# Patient Record
Sex: MALE | Race: WHITE | NOT HISPANIC OR LATINO | Employment: OTHER | URBAN - METROPOLITAN AREA
[De-identification: names, ages, dates, MRNs, and addresses within clinical notes are randomized per-mention and may not be internally consistent; named-entity substitution may affect disease eponyms.]

---

## 2023-07-11 DIAGNOSIS — I65.23 OCCLUSION AND STENOSIS OF BILATERAL CAROTID ARTERIES: Primary | ICD-10-CM

## 2023-08-02 ENCOUNTER — OFFICE VISIT (OUTPATIENT)
Dept: CARDIAC SURGERY | Facility: CLINIC | Age: 88
End: 2023-08-02
Payer: MEDICARE

## 2023-08-02 VITALS
HEIGHT: 64 IN | SYSTOLIC BLOOD PRESSURE: 135 MMHG | BODY MASS INDEX: 27.83 KG/M2 | HEART RATE: 62 BPM | DIASTOLIC BLOOD PRESSURE: 67 MMHG | WEIGHT: 163 LBS

## 2023-08-02 DIAGNOSIS — I65.23 OCCLUSION AND STENOSIS OF BILATERAL CAROTID ARTERIES: ICD-10-CM

## 2023-08-02 PROCEDURE — 1159F PR MEDICATION LIST DOCUMENTED IN MEDICAL RECORD: ICD-10-PCS | Mod: CPTII,,, | Performed by: THORACIC SURGERY (CARDIOTHORACIC VASCULAR SURGERY)

## 2023-08-02 PROCEDURE — 3288F PR FALLS RISK ASSESSMENT DOCUMENTED: ICD-10-PCS | Mod: CPTII,,, | Performed by: THORACIC SURGERY (CARDIOTHORACIC VASCULAR SURGERY)

## 2023-08-02 PROCEDURE — 3288F FALL RISK ASSESSMENT DOCD: CPT | Mod: CPTII,,, | Performed by: THORACIC SURGERY (CARDIOTHORACIC VASCULAR SURGERY)

## 2023-08-02 PROCEDURE — 1101F PR PT FALLS ASSESS DOC 0-1 FALLS W/OUT INJ PAST YR: ICD-10-PCS | Mod: CPTII,,, | Performed by: THORACIC SURGERY (CARDIOTHORACIC VASCULAR SURGERY)

## 2023-08-02 PROCEDURE — 99204 OFFICE O/P NEW MOD 45 MIN: CPT | Mod: ,,, | Performed by: THORACIC SURGERY (CARDIOTHORACIC VASCULAR SURGERY)

## 2023-08-02 PROCEDURE — 1159F MED LIST DOCD IN RCRD: CPT | Mod: CPTII,,, | Performed by: THORACIC SURGERY (CARDIOTHORACIC VASCULAR SURGERY)

## 2023-08-02 PROCEDURE — 1160F RVW MEDS BY RX/DR IN RCRD: CPT | Mod: CPTII,,, | Performed by: THORACIC SURGERY (CARDIOTHORACIC VASCULAR SURGERY)

## 2023-08-02 PROCEDURE — 1101F PT FALLS ASSESS-DOCD LE1/YR: CPT | Mod: CPTII,,, | Performed by: THORACIC SURGERY (CARDIOTHORACIC VASCULAR SURGERY)

## 2023-08-02 PROCEDURE — 99204 PR OFFICE/OUTPT VISIT, NEW, LEVL IV, 45-59 MIN: ICD-10-PCS | Mod: ,,, | Performed by: THORACIC SURGERY (CARDIOTHORACIC VASCULAR SURGERY)

## 2023-08-02 PROCEDURE — 1160F PR REVIEW ALL MEDS BY PRESCRIBER/CLIN PHARMACIST DOCUMENTED: ICD-10-PCS | Mod: CPTII,,, | Performed by: THORACIC SURGERY (CARDIOTHORACIC VASCULAR SURGERY)

## 2023-08-02 RX ORDER — FUROSEMIDE 20 MG/1
20 TABLET ORAL EVERY OTHER DAY
COMMUNITY
Start: 2023-06-14

## 2023-08-02 RX ORDER — CLOPIDOGREL BISULFATE 75 MG/1
75 TABLET ORAL DAILY
COMMUNITY

## 2023-08-02 RX ORDER — GLIPIZIDE 5 MG/1
5 TABLET, FILM COATED, EXTENDED RELEASE ORAL
COMMUNITY

## 2023-08-02 RX ORDER — ASPIRIN 81 MG/1
81 TABLET ORAL DAILY
Status: ON HOLD | COMMUNITY
End: 2024-02-15 | Stop reason: HOSPADM

## 2023-08-02 RX ORDER — FERROUS GLUCONATE 324(38)MG
324 TABLET ORAL
Status: ON HOLD | COMMUNITY
Start: 2023-07-13 | End: 2024-02-15 | Stop reason: HOSPADM

## 2023-08-02 RX ORDER — BLOOD SUGAR DIAGNOSTIC
STRIP MISCELLANEOUS
COMMUNITY
Start: 2023-06-27

## 2023-08-02 RX ORDER — SIMVASTATIN 40 MG/1
40 TABLET, FILM COATED ORAL NIGHTLY
COMMUNITY
Start: 2023-06-21

## 2023-08-02 RX ORDER — DOXAZOSIN 4 MG/1
4 TABLET ORAL
Status: ON HOLD | COMMUNITY
End: 2024-02-15 | Stop reason: HOSPADM

## 2023-08-02 NOTE — PROGRESS NOTES
"History & Physical    SUBJECTIVE:     History of Present Illness:  The patient is presenting for evaluation of bilateral carotid disease with 75% on the left side and 70% on the right.  He was evaluated by Dr. Hinson with an angiogram.  He reports dizziness.  Chief Complaint   Patient presents with    Pre-op Exam     Thad Carotid Stenosis  ref Dr. Hinson       Review of patient's allergies indicates:   Allergen Reactions    Benicar [olmesartan] Other (See Comments)       Current Outpatient Medications   Medication Sig Dispense Refill    furosemide (LASIX) 20 MG tablet Take 20 mg by mouth.      doxazosin (CARDURA) 4 MG tablet Take 4 mg by mouth.      ferrous gluconate (FERGON) 324 MG tablet Take by mouth.      glipiZIDE 5 MG TR24 Take 5 mg by mouth.      ONETOUCH ULTRA TEST Strp USE TO CHECK BLOOD GLUCOSE ONCE EVERY DAY      simvastatin (ZOCOR) 40 MG tablet Take 40 mg by mouth every evening.       No current facility-administered medications for this visit.       Past Medical History:   Diagnosis Date    Bleeding ulcer     Carotid disease, bilateral     Diabetes mellitus     Hypercholesteremia      Past Surgical History:   Procedure Laterality Date    carotid stenosis      SKIN CANCER EXCISION       History reviewed. No pertinent family history.  Social History     Tobacco Use    Smoking status: Former     Current packs/day: 0.00     Types: Cigarettes, Pipe    Smokeless tobacco: Former        Review of Systems:  Review of Systems   Neurological:  Positive for dizziness.       OBJECTIVE:     Vital Signs (Most Recent)  Pulse: 62 (08/02/23 1336)  BP: 135/67 (08/02/23 1336)  5' 4" (1.626 m)  73.9 kg (163 lb)     Physical Exam:  Physical Exam  Vitals reviewed.   Constitutional:       Appearance: Normal appearance.   HENT:      Head: Normocephalic and atraumatic.      Nose: Nose normal.      Mouth/Throat:      Mouth: Mucous membranes are dry.      Pharynx: Oropharynx is clear.   Eyes:      Extraocular Movements: Extraocular " movements intact.      Conjunctiva/sclera: Conjunctivae normal.      Pupils: Pupils are equal, round, and reactive to light.   Cardiovascular:      Rate and Rhythm: Normal rate and regular rhythm.      Pulses: Normal pulses.   Pulmonary:      Effort: Pulmonary effort is normal.      Breath sounds: Wheezing present.   Abdominal:      General: Abdomen is flat.      Palpations: Abdomen is soft.   Musculoskeletal:         General: Normal range of motion.      Cervical back: Neck supple.   Skin:     General: Skin is warm and dry.   Neurological:      General: No focal deficit present.   Psychiatric:         Mood and Affect: Mood normal.         Laboratory:  Negative      Diagnostic Results:  Angiogram reviewed.      ASSESSMENT/PLAN:     Bilateral carotid stenosis 75% on the left side and 70% on the right.  The patient definitely benefit from left carotid endarterectomy with pericardial patch angioplasty.  We will stage the right carotid for later.  The patient also would benefit from a Pulmonary consultation.  He understands the risks and benefits of procedure including risk of bleeding infection and strokes.  He elected to proceed

## 2023-08-23 ENCOUNTER — ANESTHESIA EVENT (OUTPATIENT)
Dept: SURGERY | Facility: HOSPITAL | Age: 88
DRG: 039 | End: 2023-08-23
Payer: MEDICARE

## 2023-08-25 ENCOUNTER — HOSPITAL ENCOUNTER (OUTPATIENT)
Dept: RADIOLOGY | Facility: HOSPITAL | Age: 88
Discharge: HOME | End: 2023-08-25
Attending: THORACIC SURGERY (CARDIOTHORACIC VASCULAR SURGERY)
Payer: MEDICARE

## 2023-08-25 DIAGNOSIS — I65.23 OCCLUSION AND STENOSIS OF BILATERAL CAROTID ARTERIES: ICD-10-CM

## 2023-08-25 PROCEDURE — 71046 X-RAY EXAM CHEST 2 VIEWS: CPT | Mod: TC

## 2023-08-28 ENCOUNTER — TELEPHONE (OUTPATIENT)
Dept: CARDIAC SURGERY | Facility: CLINIC | Age: 88
End: 2023-08-28
Payer: MEDICARE

## 2023-08-28 DIAGNOSIS — I65.23 OCCLUSION AND STENOSIS OF BILATERAL CAROTID ARTERIES: Primary | ICD-10-CM

## 2023-08-28 NOTE — TELEPHONE ENCOUNTER
Notified Dr. Maza/[facility] and per Dr. Maza pt to be rescheduled for next week.  Called and spoke with dgt and she will call back with date.  Notified Evelin in surgery that case in cancelled for tomorrow 8/29/23 and will call back with a date to be rescheduled.   ----- Message from Martha Tomlin sent at 8/28/2023  8:24 AM CDT -----  Regarding: Er Visit/Sx Tomorrow  Patient daughter, Dior called. They brought the pt twice to er over the weekend. First time to [facility] where he was dx with UTI w/hematuria. Sent home with medication. Hematuria worsened the next day, was brought to [facility] in [place]. ER packet/note was printed and put on your desk.     Family is worried about Sx scheduled tomorrow with . Do they need to do anything or RE-Schedule?    Please call Dior, #[phone]

## 2023-08-29 ENCOUNTER — ANESTHESIA (OUTPATIENT)
Dept: SURGERY | Facility: HOSPITAL | Age: 88
DRG: 039 | End: 2023-08-29
Payer: MEDICARE

## 2023-09-07 NOTE — PRE-PROCEDURE INSTRUCTIONS
[facility]: Outpatient Surgery  Preprocedure Instructions    Your physician's office will be calling you with your arrival time.    You will arrive at [facility], [address], [place].  Enter through the [place] entrance next to the Emergency Room, and come to the 6th floor to the Outpatient Surgery Department.     Visitory Policy:  You are allowed 2 adult visitors to be with you in the hospital. Please, no switching visitors in pre-op area. All hospital visitors should be in good current health.  No small children.     What to Bring:  Please have your ID, insurance cards, and all home medication bottles with you at check in.  Bring your CPAP machine if one is used at home.     Fasting:  Nothing to eat or drink after midnight the night before your procedure. This includes no ice, gum, hard candies, and/or tobacco products.    Medications:  Follow your doctor's instructions for taking any medications on the morning of your procedure.  If no instructions for taking medications were given, do not take any medications but bring your medications in their bottles to your procedure check in.     Follow your doctor's preoperative instructions regarding skin prep, bowel prep, bathing, or showering prior to your procedure.  If any special soaps were provided to you, please use according to your doctor's instructions. If no instructions were given from your doctor, take a good bath or shower with antibacterial soap the night before and the morning of your procedure.  On the morning of procedure, wear loose, comfortable clothing.  No lotions, makeup, perfumes, colognes, deodorant, or jewelry to your procedure.  Removable items (glasses, contact lenses, dentures, retainers, hearing aids) need to be removed for your procedure.  Bring your storage containers for these items if you wear them.     Artificial nails, body jewelry, eyelash extensions, and/or hair extensions with metal  clips are not allowed during your surgery.  If you currently wear any of these items, please arrange for them to be removed prior to your arrival to the hospital.     Outpatient or Same Day Surgeries:  Any patients receiving sedation/anesthesia are advised not to drive for 24 hours after their procedure.  We do not allow patients to drive themselves home after discharge.  If you are going home after your procedure, please have someone available to drive you home from the hospital.        You may call the Outpatient Surgery Department at [phone] with any questions or concerns.  We are looking forward to meeting you and taking great care of you for your procedure.  Thank you for choosing [facility] for your surgical needs.

## 2023-09-07 NOTE — H&P
[facility]  History & Physical  Cardiothoracic Surgery    SUBJECTIVE:     Chief Complaint/Reason for Admission: dizziness    History of Present Illness:  Patient is a 89 y.o. male presents with The patient is presenting for evaluation of bilateral carotid disease with 75% on the left side and 70% on the right.  He was evaluated by Dr. Hinson with an angiogram.  He reports dizziness.      Family History of Heart Disease: no    No current facility-administered medications on file prior to encounter.     Current Outpatient Medications on File Prior to Encounter   Medication Sig Dispense Refill    aspirin (ECOTRIN) 81 MG EC tablet Take 81 mg by mouth once daily.      clopidogreL (PLAVIX) 75 mg tablet Take 75 mg by mouth once daily.      doxazosin (CARDURA) 4 MG tablet Take 4 mg by mouth.      ferrous gluconate (FERGON) 324 MG tablet Take by mouth.      furosemide (LASIX) 20 MG tablet Take 20 mg by mouth.      glipiZIDE 5 MG TR24 Take 5 mg by mouth.      ONETOUCH ULTRA TEST Strp USE TO CHECK BLOOD GLUCOSE ONCE EVERY DAY      simvastatin (ZOCOR) 40 MG tablet Take 40 mg by mouth every evening.          Review of patient's allergies indicates:   Allergen Reactions    Benicar [olmesartan] Other (See Comments)        Past Medical History:   Diagnosis Date    Bleeding ulcer     Carotid disease, bilateral     Chronic kidney failure, stage 3 (moderate)     Cough     Diabetes mellitus     HLD (hyperlipidemia)     Hypercholesteremia     Lightheadedness     Skin cancer of face     SOB (shortness of breath)         Past Surgical History:   Procedure Laterality Date    ESOPHAGOGASTRODUODENOSCOPY      SKIN CANCER EXCISION      multiple           Review of Systems:  Review of Systems   Neurological:  Positive for dizziness.   All other systems reviewed and are negative.       OBJECTIVE:        Physical Exam:  Physical Exam  Vitals reviewed.   HENT:      Head: Normocephalic.      Right Ear: Tympanic  membrane normal.      Left Ear: Tympanic membrane normal.      Nose: Nose normal.      Mouth/Throat:      Mouth: Mucous membranes are moist.   Eyes:      Pupils: Pupils are equal, round, and reactive to light.   Cardiovascular:      Rate and Rhythm: Normal rate and regular rhythm.      Pulses: Normal pulses.   Pulmonary:      Effort: Pulmonary effort is normal.      Breath sounds: Normal breath sounds.   Abdominal:      Palpations: Abdomen is soft.   Musculoskeletal:         General: Normal range of motion.      Cervical back: Normal range of motion.   Skin:     General: Skin is warm.   Neurological:      General: No focal deficit present.      Mental Status: He is alert.   Psychiatric:         Mood and Affect: Mood normal.          Laboratory:  I have reviewed all pertinent lab results within the past 24 hours.    Diagnostic Results:  Carotid Duplex: Reviewed          ASSESSMENT/PLAN:     A: Bilateral carotid stenosis  P: L CEA

## 2023-09-08 ENCOUNTER — HOSPITAL ENCOUNTER (INPATIENT)
Facility: HOSPITAL | Age: 88
LOS: 1 days | Discharge: HOME | DRG: 039 | End: 2023-09-09
Attending: THORACIC SURGERY (CARDIOTHORACIC VASCULAR SURGERY) | Admitting: THORACIC SURGERY (CARDIOTHORACIC VASCULAR SURGERY)
Payer: MEDICARE

## 2023-09-08 DIAGNOSIS — I65.23 OCCLUSION AND STENOSIS OF BILATERAL CAROTID ARTERIES: ICD-10-CM

## 2023-09-08 LAB
ANION GAP SERPL CALC-SCNC: 9 MEQ/L
BUN SERPL-MCNC: 25 MG/DL (ref 8.4–25.7)
CALCIUM SERPL-MCNC: 9.2 MG/DL (ref 8.8–10)
CHLORIDE SERPL-SCNC: 109 MMOL/L (ref 98–111)
CO2 SERPL-SCNC: 24 MMOL/L (ref 23–31)
CREAT SERPL-MCNC: 1.69 MG/DL (ref 0.73–1.18)
CREAT/UREA NIT SERPL: 15
GFR SERPLBLD CREATININE-BSD FMLA CKD-EPI: 38 MLS/MIN/1.73/M2
GLUCOSE SERPL-MCNC: 103 MG/DL (ref 75–121)
GROUP & RH: NORMAL
INDIRECT COOMBS GEL: NORMAL
POCT GLUCOSE: 120 MG/DL (ref 70–110)
POTASSIUM SERPL-SCNC: 4.3 MMOL/L (ref 3.5–5.1)
SODIUM SERPL-SCNC: 142 MMOL/L (ref 132–146)
SPECIMEN OUTDATE: NORMAL

## 2023-09-08 PROCEDURE — 25000003 PHARM REV CODE 250: Performed by: PHYSICIAN ASSISTANT

## 2023-09-08 PROCEDURE — 86901 BLOOD TYPING SEROLOGIC RH(D): CPT | Performed by: THORACIC SURGERY (CARDIOTHORACIC VASCULAR SURGERY)

## 2023-09-08 PROCEDURE — D9220A PRA ANESTHESIA: Mod: CRNA,,, | Performed by: NURSE ANESTHETIST, CERTIFIED REGISTERED

## 2023-09-08 PROCEDURE — 35301 PR THROMBOENDARTECTMY NECK,NECK INCIS: ICD-10-PCS | Mod: AS,LT,, | Performed by: PHYSICIAN ASSISTANT

## 2023-09-08 PROCEDURE — 36000707: Performed by: THORACIC SURGERY (CARDIOTHORACIC VASCULAR SURGERY)

## 2023-09-08 PROCEDURE — 35301 RECHANNELING OF ARTERY: CPT | Mod: LT,,, | Performed by: THORACIC SURGERY (CARDIOTHORACIC VASCULAR SURGERY)

## 2023-09-08 PROCEDURE — 71000039 HC RECOVERY, EACH ADD'L HOUR: Performed by: THORACIC SURGERY (CARDIOTHORACIC VASCULAR SURGERY)

## 2023-09-08 PROCEDURE — 25000003 PHARM REV CODE 250: Performed by: THORACIC SURGERY (CARDIOTHORACIC VASCULAR SURGERY)

## 2023-09-08 PROCEDURE — 71000033 HC RECOVERY, INTIAL HOUR: Performed by: THORACIC SURGERY (CARDIOTHORACIC VASCULAR SURGERY)

## 2023-09-08 PROCEDURE — 11000001 HC ACUTE MED/SURG PRIVATE ROOM

## 2023-09-08 PROCEDURE — D9220A PRA ANESTHESIA: ICD-10-PCS | Mod: ANES,,, | Performed by: ANESTHESIOLOGY

## 2023-09-08 PROCEDURE — 99499 NO LOS: ICD-10-PCS | Mod: ,,, | Performed by: PHYSICIAN ASSISTANT

## 2023-09-08 PROCEDURE — P9047 ALBUMIN (HUMAN), 25%, 50ML: HCPCS | Mod: JZ,JG | Performed by: NURSE ANESTHETIST, CERTIFIED REGISTERED

## 2023-09-08 PROCEDURE — 88311 DECALCIFY TISSUE: CPT

## 2023-09-08 PROCEDURE — 99499 UNLISTED E&M SERVICE: CPT | Mod: ,,, | Performed by: PHYSICIAN ASSISTANT

## 2023-09-08 PROCEDURE — 35301 RECHANNELING OF ARTERY: CPT | Mod: AS,LT,, | Performed by: PHYSICIAN ASSISTANT

## 2023-09-08 PROCEDURE — 86923 COMPATIBILITY TEST ELECTRIC: CPT | Performed by: THORACIC SURGERY (CARDIOTHORACIC VASCULAR SURGERY)

## 2023-09-08 PROCEDURE — 36620 ARTERIAL: ICD-10-PCS | Mod: 59,,, | Performed by: ANESTHESIOLOGY

## 2023-09-08 PROCEDURE — 80048 BASIC METABOLIC PNL TOTAL CA: CPT | Performed by: THORACIC SURGERY (CARDIOTHORACIC VASCULAR SURGERY)

## 2023-09-08 PROCEDURE — 63600175 PHARM REV CODE 636 W HCPCS: Mod: JZ,JG | Performed by: NURSE ANESTHETIST, CERTIFIED REGISTERED

## 2023-09-08 PROCEDURE — 35301 PR THROMBOENDARTECTMY NECK,NECK INCIS: ICD-10-PCS | Mod: LT,,, | Performed by: THORACIC SURGERY (CARDIOTHORACIC VASCULAR SURGERY)

## 2023-09-08 PROCEDURE — 88304 TISSUE EXAM BY PATHOLOGIST: CPT | Performed by: THORACIC SURGERY (CARDIOTHORACIC VASCULAR SURGERY)

## 2023-09-08 PROCEDURE — D9220A PRA ANESTHESIA: ICD-10-PCS | Mod: CRNA,,, | Performed by: NURSE ANESTHETIST, CERTIFIED REGISTERED

## 2023-09-08 PROCEDURE — 63600175 PHARM REV CODE 636 W HCPCS: Performed by: THORACIC SURGERY (CARDIOTHORACIC VASCULAR SURGERY)

## 2023-09-08 PROCEDURE — D9220A PRA ANESTHESIA: Mod: ANES,,, | Performed by: ANESTHESIOLOGY

## 2023-09-08 PROCEDURE — 36620 INSERTION CATHETER ARTERY: CPT | Performed by: ANESTHESIOLOGY

## 2023-09-08 PROCEDURE — 63600175 PHARM REV CODE 636 W HCPCS: Performed by: ANESTHESIOLOGY

## 2023-09-08 PROCEDURE — 37000009 HC ANESTHESIA EA ADD 15 MINS: Performed by: THORACIC SURGERY (CARDIOTHORACIC VASCULAR SURGERY)

## 2023-09-08 PROCEDURE — 25000003 PHARM REV CODE 250: Performed by: NURSE ANESTHETIST, CERTIFIED REGISTERED

## 2023-09-08 PROCEDURE — 37000008 HC ANESTHESIA 1ST 15 MINUTES: Performed by: THORACIC SURGERY (CARDIOTHORACIC VASCULAR SURGERY)

## 2023-09-08 PROCEDURE — 36000706: Performed by: THORACIC SURGERY (CARDIOTHORACIC VASCULAR SURGERY)

## 2023-09-08 RX ORDER — FENTANYL CITRATE 50 UG/ML
INJECTION, SOLUTION INTRAMUSCULAR; INTRAVENOUS
Status: DISCONTINUED | OUTPATIENT
Start: 2023-09-08 | End: 2023-09-28

## 2023-09-08 RX ORDER — ONDANSETRON 2 MG/ML
4 INJECTION INTRAMUSCULAR; INTRAVENOUS EVERY 12 HOURS PRN
Status: DISCONTINUED | OUTPATIENT
Start: 2023-09-08 | End: 2023-09-09 | Stop reason: HOSPADM

## 2023-09-08 RX ORDER — DIPHENHYDRAMINE HYDROCHLORIDE 50 MG/ML
25 INJECTION INTRAMUSCULAR; INTRAVENOUS EVERY 6 HOURS PRN
Status: DISCONTINUED | OUTPATIENT
Start: 2023-09-08 | End: 2023-09-08 | Stop reason: HOSPADM

## 2023-09-08 RX ORDER — HYDRALAZINE HYDROCHLORIDE 20 MG/ML
5 INJECTION INTRAMUSCULAR; INTRAVENOUS
Status: DISCONTINUED | OUTPATIENT
Start: 2023-09-08 | End: 2023-09-09 | Stop reason: HOSPADM

## 2023-09-08 RX ORDER — ETOMIDATE 2 MG/ML
INJECTION INTRAVENOUS
Status: DISCONTINUED | OUTPATIENT
Start: 2023-09-08 | End: 2023-09-28

## 2023-09-08 RX ORDER — ASPIRIN 81 MG/1
81 TABLET ORAL DAILY
Status: DISCONTINUED | OUTPATIENT
Start: 2023-09-08 | End: 2023-09-09 | Stop reason: HOSPADM

## 2023-09-08 RX ORDER — ALBUMIN HUMAN 250 G/1000ML
SOLUTION INTRAVENOUS CONTINUOUS PRN
Status: DISCONTINUED | OUTPATIENT
Start: 2023-09-08 | End: 2023-09-28

## 2023-09-08 RX ORDER — HEPARIN SODIUM 1000 [USP'U]/ML
INJECTION, SOLUTION INTRAVENOUS; SUBCUTANEOUS
Status: DISCONTINUED | OUTPATIENT
Start: 2023-09-08 | End: 2023-09-28

## 2023-09-08 RX ORDER — MEPERIDINE HYDROCHLORIDE 25 MG/ML
12.5 INJECTION INTRAMUSCULAR; INTRAVENOUS; SUBCUTANEOUS ONCE
Status: DISCONTINUED | OUTPATIENT
Start: 2023-09-08 | End: 2023-09-08 | Stop reason: HOSPADM

## 2023-09-08 RX ORDER — ACETAMINOPHEN 325 MG/1
650 TABLET ORAL EVERY 8 HOURS PRN
Status: DISCONTINUED | OUTPATIENT
Start: 2023-09-08 | End: 2023-09-09 | Stop reason: HOSPADM

## 2023-09-08 RX ORDER — PROTAMINE SULFATE 10 MG/ML
INJECTION, SOLUTION INTRAVENOUS
Status: DISCONTINUED | OUTPATIENT
Start: 2023-09-08 | End: 2023-09-28

## 2023-09-08 RX ORDER — HYDROMORPHONE HYDROCHLORIDE 2 MG/ML
0.5 INJECTION, SOLUTION INTRAMUSCULAR; INTRAVENOUS; SUBCUTANEOUS EVERY 5 MIN PRN
Status: DISCONTINUED | OUTPATIENT
Start: 2023-09-08 | End: 2023-09-08 | Stop reason: HOSPADM

## 2023-09-08 RX ORDER — ONDANSETRON 2 MG/ML
INJECTION INTRAMUSCULAR; INTRAVENOUS
Status: DISCONTINUED | OUTPATIENT
Start: 2023-09-08 | End: 2023-09-28

## 2023-09-08 RX ORDER — DEXAMETHASONE SODIUM PHOSPHATE 4 MG/ML
INJECTION, SOLUTION INTRA-ARTICULAR; INTRALESIONAL; INTRAMUSCULAR; INTRAVENOUS; SOFT TISSUE
Status: DISCONTINUED | OUTPATIENT
Start: 2023-09-08 | End: 2023-09-28

## 2023-09-08 RX ORDER — LOPERAMIDE HYDROCHLORIDE 2 MG/1
4 CAPSULE ORAL ONCE
Status: DISCONTINUED | OUTPATIENT
Start: 2023-09-08 | End: 2023-09-09 | Stop reason: HOSPADM

## 2023-09-08 RX ORDER — FENTANYL CITRATE 50 UG/ML
INJECTION, SOLUTION INTRAMUSCULAR; INTRAVENOUS
Status: COMPLETED
Start: 2023-09-08 | End: 2023-09-08

## 2023-09-08 RX ORDER — ONDANSETRON 2 MG/ML
4 INJECTION INTRAMUSCULAR; INTRAVENOUS ONCE
Status: DISCONTINUED | OUTPATIENT
Start: 2023-09-08 | End: 2023-09-08 | Stop reason: HOSPADM

## 2023-09-08 RX ORDER — ROCURONIUM BROMIDE 10 MG/ML
INJECTION, SOLUTION INTRAVENOUS
Status: DISCONTINUED | OUTPATIENT
Start: 2023-09-08 | End: 2023-09-28

## 2023-09-08 RX ORDER — HEPARIN SODIUM 5000 [USP'U]/ML
INJECTION, SOLUTION INTRAVENOUS; SUBCUTANEOUS
Status: DISCONTINUED | OUTPATIENT
Start: 2023-09-08 | End: 2023-09-08 | Stop reason: HOSPADM

## 2023-09-08 RX ORDER — PROPOFOL 10 MG/ML
VIAL (ML) INTRAVENOUS
Status: DISCONTINUED | OUTPATIENT
Start: 2023-09-08 | End: 2023-09-28

## 2023-09-08 RX ORDER — HYDROCODONE BITARTRATE AND ACETAMINOPHEN 5; 325 MG/1; MG/1
1 TABLET ORAL EVERY 4 HOURS PRN
Status: DISCONTINUED | OUTPATIENT
Start: 2023-09-08 | End: 2023-09-09 | Stop reason: HOSPADM

## 2023-09-08 RX ORDER — DOCUSATE SODIUM 100 MG/1
100 CAPSULE, LIQUID FILLED ORAL 2 TIMES DAILY
Status: DISCONTINUED | OUTPATIENT
Start: 2023-09-08 | End: 2023-09-09 | Stop reason: HOSPADM

## 2023-09-08 RX ORDER — HYDROMORPHONE HYDROCHLORIDE 2 MG/ML
0.2 INJECTION, SOLUTION INTRAMUSCULAR; INTRAVENOUS; SUBCUTANEOUS EVERY 5 MIN PRN
Status: DISCONTINUED | OUTPATIENT
Start: 2023-09-08 | End: 2023-09-08 | Stop reason: HOSPADM

## 2023-09-08 RX ORDER — PHENYLEPHRINE HYDROCHLORIDE 10 MG/ML
INJECTION INTRAVENOUS
Status: DISCONTINUED | OUTPATIENT
Start: 2023-09-08 | End: 2023-09-28

## 2023-09-08 RX ORDER — EPHEDRINE SULFATE 50 MG/ML
INJECTION, SOLUTION INTRAVENOUS
Status: DISCONTINUED | OUTPATIENT
Start: 2023-09-08 | End: 2023-09-28

## 2023-09-08 RX ORDER — PROCHLORPERAZINE EDISYLATE 5 MG/ML
5 INJECTION INTRAMUSCULAR; INTRAVENOUS EVERY 6 HOURS PRN
Status: DISCONTINUED | OUTPATIENT
Start: 2023-09-08 | End: 2023-09-09 | Stop reason: HOSPADM

## 2023-09-08 RX ORDER — LIDOCAINE HYDROCHLORIDE 20 MG/ML
INJECTION, SOLUTION EPIDURAL; INFILTRATION; INTRACAUDAL; PERINEURAL
Status: DISCONTINUED | OUTPATIENT
Start: 2023-09-08 | End: 2023-09-28

## 2023-09-08 RX ADMIN — ETOMIDATE 20 MG: 2 INJECTION INTRAVENOUS at 11:09

## 2023-09-08 RX ADMIN — PHENYLEPHRINE HYDROCHLORIDE 0.3 MCG/KG/MIN: 10 INJECTION INTRAVENOUS at 11:09

## 2023-09-08 RX ADMIN — PROPOFOL 40 MG: 10 INJECTION, EMULSION INTRAVENOUS at 11:09

## 2023-09-08 RX ADMIN — DEXTROSE MONOHYDRATE 1.5 G: 5 INJECTION INTRAVENOUS at 11:09

## 2023-09-08 RX ADMIN — DOCUSATE SODIUM 100 MG: 100 CAPSULE, LIQUID FILLED ORAL at 08:09

## 2023-09-08 RX ADMIN — ONDANSETRON 4 MG: 2 INJECTION INTRAMUSCULAR; INTRAVENOUS at 11:09

## 2023-09-08 RX ADMIN — PHENYLEPHRINE HYDROCHLORIDE 100 MCG: 10 INJECTION INTRAVENOUS at 11:09

## 2023-09-08 RX ADMIN — PROTAMINE SULFATE 100 MG: 10 INJECTION, SOLUTION INTRAVENOUS at 12:09

## 2023-09-08 RX ADMIN — SUGAMMADEX 160 MG: 100 INJECTION, SOLUTION INTRAVENOUS at 12:09

## 2023-09-08 RX ADMIN — ALBUMIN (HUMAN): 12.5 SOLUTION INTRAVENOUS at 11:09

## 2023-09-08 RX ADMIN — ROCURONIUM BROMIDE 40 MG: 10 SOLUTION INTRAVENOUS at 11:09

## 2023-09-08 RX ADMIN — ACETAMINOPHEN 650 MG: 325 TABLET, FILM COATED ORAL at 08:09

## 2023-09-08 RX ADMIN — ASPIRIN 81 MG: 81 TABLET, COATED ORAL at 12:09

## 2023-09-08 RX ADMIN — SODIUM CHLORIDE, SODIUM GLUCONATE, SODIUM ACETATE, POTASSIUM CHLORIDE AND MAGNESIUM CHLORIDE: 526; 502; 368; 37; 30 INJECTION, SOLUTION INTRAVENOUS at 10:09

## 2023-09-08 RX ADMIN — LIDOCAINE HYDROCHLORIDE 80 MG: 20 INJECTION, SOLUTION EPIDURAL; INFILTRATION; INTRACAUDAL; PERINEURAL at 11:09

## 2023-09-08 RX ADMIN — DEXAMETHASONE SODIUM PHOSPHATE 4 MG: 4 INJECTION, SOLUTION INTRA-ARTICULAR; INTRALESIONAL; INTRAMUSCULAR; INTRAVENOUS; SOFT TISSUE at 11:09

## 2023-09-08 RX ADMIN — HEPARIN SODIUM 10000 UNITS: 1000 INJECTION, SOLUTION INTRAVENOUS; SUBCUTANEOUS at 11:09

## 2023-09-08 RX ADMIN — EPHEDRINE SULFATE 5 MG: 50 INJECTION INTRAVENOUS at 11:09

## 2023-09-08 RX ADMIN — PHENYLEPHRINE HYDROCHLORIDE 50 MCG: 10 INJECTION INTRAVENOUS at 11:09

## 2023-09-08 RX ADMIN — FENTANYL CITRATE 50 MCG: 50 INJECTION, SOLUTION INTRAMUSCULAR; INTRAVENOUS at 09:09

## 2023-09-08 RX ADMIN — EPHEDRINE SULFATE 10 MG: 50 INJECTION INTRAVENOUS at 12:09

## 2023-09-08 RX ADMIN — ROCURONIUM BROMIDE 10 MG: 10 SOLUTION INTRAVENOUS at 11:09

## 2023-09-08 RX ADMIN — FENTANYL CITRATE 50 MCG: 50 INJECTION, SOLUTION INTRAMUSCULAR; INTRAVENOUS at 11:09

## 2023-09-08 NOTE — OP NOTE
Preop diagnosis:  Severe left carotid stenosis    Procedure: Left carotid endarterectomy     Postop diagnosis:  The same, large left carotid artery    Surgeon:Jacquie Maza MD    Assistant:  MARIA Dillon    Date:  09/08/2023  Blood loss:  Minimal    Anaesthesia:  General    Under informed consent the patient was taken to the OR put in supine position general anesthesia was induced and therefore maintained for the remainder of the procedure.  The skin of the left neck was prepped and draped in the usual sterile fashion IV antibiotics administered anesthesia placed appropriate lines.    A small incision was made anterior to sternocleidomastoid muscle taking down subcutaneous tissue and platysma exposing the carotid sheath.  The facial vein was cut between ligatures.  I had good exposure of the common internal and external carotid artery.  The patient was heparinized.  3 minute after heparin clamps were applied on the internal common and external carotid artery.  The cerebral oxygen monitor was stable.  Arteriotomy was performed and extended in the direction of the internal carotid artery.  There was excellent backflow from it.  Endarterectomy was performed with eversion endarterectomy of the external carotid artery and very smooth transition plane distally on the internal carotid artery.  The defect was cleaned very well and irrigated with heparinized saline.  It was closed primarily as this is a very large carotid and I was worried the patch is going to kink it .de-airing was performed from the internal carotid artery prior to tying the knots.  The flow was then reestablished in the direction of the external  then internal carotid artery.  Heparin was reversed with protamine.  There was good with hemostasis.  The wounds were irrigated and closed in layers absorbable sutures.    The patient was transferred to the recovery room in a stable situation.

## 2023-09-08 NOTE — ANESTHESIA PREPROCEDURE EVALUATION
09/08/2023  Sandra Rees is a 89 y.o., male h/o Bilat carotid stenosis, HTN, prob CAD, CKD    EKG 1DAVB  HGB 12.9    Pre-op Assessment    I have reviewed the Patient Summary Reports.     I have reviewed the Nursing Notes. I have reviewed the NPO Status.   I have reviewed the Medications.     Review of Systems  Anesthesia Hx:  No problems with previous Anesthesia    Renal/:   Chronic Renal Disease    Endocrine:   Diabetes        Physical Exam  General: Well nourished, Cooperative, Alert and Oriented    Airway:  Mallampati: II   Mouth Opening: Normal  TM Distance: Normal  Tongue: Normal  Neck ROM: Normal ROM    Dental:  Edentulous        Anesthesia Plan  Type of Anesthesia, risks & benefits discussed:    Anesthesia Type: Gen ETT  Intra-op Monitoring Plan: Standard ASA Monitors and Art Line  Post Op Pain Control Plan: multimodal analgesia and IV/PO Opioids PRN  Airway Plan: Direct, Post-Induction  Informed Consent: Informed consent signed with the Patient and all parties understand the risks and agree with anesthesia plan.  All questions answered. Patient consented to blood products? Yes  ASA Score: 3  Day of Surgery Review of History & Physical: H&P Update referred to the surgeon/provider.    Ready For Surgery From Anesthesia Perspective.     .

## 2023-09-08 NOTE — ANESTHESIA PROCEDURE NOTES
Arterial    Diagnosis: cAROTID sTENOSIS    Patient location during procedure: holding area  Procedure start time: 9/8/2023 9:52 AM  Timeout: 9/8/2023 9:52 AM  Procedure end time: 9/8/2023 9:53 AM    Staffing  Authorizing Provider: Jeanne Rodriguez MD  Performing Provider: Jeanne Rodriguez MD    Staffing  Performed by: Jeanne Rodriguez MD  Authorized by: Jeanne Rodriguez MD    Anesthesiologist was present at the time of the procedure.    Preanesthetic Checklist  Completed: patient identified, IV checked, site marked, risks and benefits discussed, surgical consent, monitors and equipment checked, pre-op evaluation, timeout performed and anesthesia consent givenArterial  Skin Prep: chlorhexidine gluconate  Local Infiltration: lidocaine  Orientation: left  Location: radial    Catheter Size: 20 G  Catheter placement by Anatomical landmarks. Heme positive aspiration all ports. Insertion Attempts: 1  Assessment  Dressing: secured with tape and tegaderm  Patient: Tolerated well

## 2023-09-08 NOTE — ANESTHESIA PROCEDURE NOTES
Intubation    Date/Time: 9/8/2023 11:07 AM    Performed by: Catalino Og CRNA  Authorized by: Catalino Og CRNA    Intubation:     Induction:  Intravenous    Intubated:  Postinduction    Mask Ventilation:  Easy with oral airway    Attempts:  1    Attempted By:  CRNA    Method of Intubation:  Direct    Blade:  [place]ock[place] 2    Laryngeal View Grade: Grade I - full view of cords      Difficult Airway Encountered?: No      Complications:  None    Airway Device:  Oral endotracheal tube    Airway Device Size:  7.5    Style/Cuff Inflation:  Cuffed    Inflation Amount (mL):  5    Tube secured:  23    Secured at:  The lips    Placement Verified By:  Capnometry    Complicating Factors:  None    Findings Post-Intubation:  BS equal bilateral and atraumatic/condition of teeth unchanged

## 2023-09-08 NOTE — NURSING
Nurses Note -- 4 Eyes      9/8/2023   4:43 PM      Skin assessed during: Admit      [x] No Altered Skin Integrity Present    [x]Prevention Measures Documented      [] Yes- Altered Skin Integrity Present or Discovered   [] LDA Added if Not in Epic (Describe Wound)   [] New Altered Skin Integrity was Present on Admit and Documented in LDA   [] Wound Image Taken    Wound Care Consulted? No    Attending Nurse:  Charline Tucker RN/Staff Member:   [email or web address]

## 2023-09-09 VITALS
OXYGEN SATURATION: 95 % | SYSTOLIC BLOOD PRESSURE: 122 MMHG | DIASTOLIC BLOOD PRESSURE: 53 MMHG | TEMPERATURE: 98 F | HEART RATE: 61 BPM | RESPIRATION RATE: 18 BRPM | BODY MASS INDEX: 27.83 KG/M2 | WEIGHT: 163 LBS | HEIGHT: 64 IN

## 2023-09-09 PROBLEM — I65.23 OCCLUSION AND STENOSIS OF BILATERAL CAROTID ARTERIES: Status: ACTIVE | Noted: 2023-09-09

## 2023-09-09 LAB
ANION GAP SERPL CALC-SCNC: 11 MEQ/L
BUN SERPL-MCNC: 29.3 MG/DL (ref 8.4–25.7)
CALCIUM SERPL-MCNC: 8.7 MG/DL (ref 8.8–10)
CHLORIDE SERPL-SCNC: 107 MMOL/L (ref 98–111)
CO2 SERPL-SCNC: 23 MMOL/L (ref 23–31)
CREAT SERPL-MCNC: 1.74 MG/DL (ref 0.73–1.18)
CREAT/UREA NIT SERPL: 17
GFR SERPLBLD CREATININE-BSD FMLA CKD-EPI: 36 MLS/MIN/1.73/M2
GLUCOSE SERPL-MCNC: 165 MG/DL (ref 75–121)
POTASSIUM SERPL-SCNC: 5.2 MMOL/L (ref 3.5–5.1)
SODIUM SERPL-SCNC: 141 MMOL/L (ref 132–146)

## 2023-09-09 PROCEDURE — 94761 N-INVAS EAR/PLS OXIMETRY MLT: CPT

## 2023-09-09 PROCEDURE — 80048 BASIC METABOLIC PNL TOTAL CA: CPT | Performed by: PHYSICIAN ASSISTANT

## 2023-09-09 PROCEDURE — 25000003 PHARM REV CODE 250: Performed by: PHYSICIAN ASSISTANT

## 2023-09-09 PROCEDURE — 27000221 HC OXYGEN, UP TO 24 HOURS

## 2023-09-09 RX ADMIN — ASPIRIN 81 MG: 81 TABLET, COATED ORAL at 09:09

## 2023-09-09 RX ADMIN — DOCUSATE SODIUM 100 MG: 100 CAPSULE, LIQUID FILLED ORAL at 09:09

## 2023-09-09 NOTE — DISCHARGE SUMMARY
[facility] 6th Floor Medical Telemetry  Cardiothoracic Surgery  Discharge Summary      Patient Name: Sandra Rees  MRN: [id number]  Admission Date: 9/8/2023  Hospital Length of Stay: 1 days  Discharge Date and Time: No discharge date for patient encounter.  Attending Physician: Jacquie Maza MD   Discharging Provider: MARIA Dillon  Primary Care Provider: Luis Shah MD    HPI:   No notes on file    Procedure(s) (LRB):  ENDARTERECTOMY-CAROTID (Left)      Indwelling Lines/Drains at time of discharge:   Lines/Drains/Airways     None               Hospital Course: No notes on file    Goals of Care Treatment Preferences:             Significant Diagnostic Studies: Labs: All labs within the past 24 hours have been reviewed    Pending Diagnostic Studies:     Procedure Component Value Units Date/Time    Specimen to Pathology [[id number]] Collected: 09/08/23 1148    Order Status: Sent Lab Status: No result     Specimen: Tissue from Carotid           No new Assessment & Plan notes have been filed under this hospital service since the last note was generated.  Service: Cardiothoracic Surgery    Final Active Diagnoses:    Diagnosis Date Noted POA    PRINCIPAL PROBLEM:  Occlusion and stenosis of bilateral carotid arteries [I65.23] 09/09/2023 Unknown      Problems Resolved During this Admission:      Discharged Condition: good    Disposition: Home or Self Care    Follow Up:   Follow-up Information     Jacquie Maza MD. Call in 3 week(s).    Specialties: Cardiothoracic Surgery, Cardiology  Contact information:  [address] [place] [address]  [address]  [facility] [address]  [phone]                       Patient Instructions:   No discharge procedures on file.  Medications:  Reconciled Home Medications:      Medication List      CONTINUE taking these medications    aspirin 81 MG EC tablet  Commonly known as: ECOTRIN  Take 81 mg by mouth once daily.     clopidogreL 75 mg tablet  Commonly known as:  PLAVIX  Take 75 mg by mouth once daily.     doxazosin 4 MG tablet  Commonly known as: CARDURA  Take 4 mg by mouth.     ferrous gluconate 324 MG tablet  Commonly known as: FERGON  Take by mouth.     furosemide 20 MG tablet  Commonly known as: LASIX  Take 20 mg by mouth.     glipiZIDE 5 MG Tr24  Take 5 mg by mouth.     ONETOUCH ULTRA TEST Strp  Generic drug: blood sugar diagnostic  USE TO CHECK BLOOD GLUCOSE ONCE EVERY DAY     simvastatin 40 MG tablet  Commonly known as: ZOCOR  Take 40 mg by mouth every evening.          POD #1  Awake. Alert.  Sitting up in bed  Ambulated well this a.m.  Tolerating po diet. Pain controlled with tylenol    AFVSS  Heart: RRR  Lungs: respirations nonlabored, clear  Incision: c/d/i  Neuro: symmetric facies. Tongue midline. Motor intact    A/p: s/p L CEA  - d/c home  - f/u 3 weeks      Time spent on the discharge of patient: 20 minutes    MARIA Dillon  Cardiothoracic Surgery  [facility] - 6th Floor Medical Telemetry

## 2023-09-12 LAB
ABO + RH BLD: NORMAL
ABO + RH BLD: NORMAL
BLD PROD TYP BPU: NORMAL
BLD PROD TYP BPU: NORMAL
BLOOD UNIT EXPIRATION DATE: NORMAL
BLOOD UNIT EXPIRATION DATE: NORMAL
BLOOD UNIT TYPE CODE: 5100
BLOOD UNIT TYPE CODE: 5100
CROSSMATCH INTERPRETATION: NORMAL
CROSSMATCH INTERPRETATION: NORMAL
DISPENSE STATUS: NORMAL
DISPENSE STATUS: NORMAL
PSYCHE PATHOLOGY RESULT: NORMAL
UNIT NUMBER: NORMAL
UNIT NUMBER: NORMAL

## 2023-09-20 ENCOUNTER — OFFICE VISIT (OUTPATIENT)
Dept: CARDIAC SURGERY | Facility: CLINIC | Age: 88
End: 2023-09-20
Payer: MEDICARE

## 2023-09-20 VITALS
SYSTOLIC BLOOD PRESSURE: 120 MMHG | HEART RATE: 89 BPM | DIASTOLIC BLOOD PRESSURE: 64 MMHG | OXYGEN SATURATION: 97 % | BODY MASS INDEX: 27.45 KG/M2 | HEIGHT: 64 IN | WEIGHT: 160.81 LBS

## 2023-09-20 DIAGNOSIS — I65.23 OCCLUSION AND STENOSIS OF BILATERAL CAROTID ARTERIES: Primary | ICD-10-CM

## 2023-09-20 PROCEDURE — 1159F MED LIST DOCD IN RCRD: CPT | Mod: CPTII,,, | Performed by: THORACIC SURGERY (CARDIOTHORACIC VASCULAR SURGERY)

## 2023-09-20 PROCEDURE — 1126F AMNT PAIN NOTED NONE PRSNT: CPT | Mod: CPTII,,, | Performed by: THORACIC SURGERY (CARDIOTHORACIC VASCULAR SURGERY)

## 2023-09-20 PROCEDURE — 3288F PR FALLS RISK ASSESSMENT DOCUMENTED: ICD-10-PCS | Mod: CPTII,,, | Performed by: THORACIC SURGERY (CARDIOTHORACIC VASCULAR SURGERY)

## 2023-09-20 PROCEDURE — 1101F PT FALLS ASSESS-DOCD LE1/YR: CPT | Mod: CPTII,,, | Performed by: THORACIC SURGERY (CARDIOTHORACIC VASCULAR SURGERY)

## 2023-09-20 PROCEDURE — 1160F RVW MEDS BY RX/DR IN RCRD: CPT | Mod: CPTII,,, | Performed by: THORACIC SURGERY (CARDIOTHORACIC VASCULAR SURGERY)

## 2023-09-20 PROCEDURE — 3288F FALL RISK ASSESSMENT DOCD: CPT | Mod: CPTII,,, | Performed by: THORACIC SURGERY (CARDIOTHORACIC VASCULAR SURGERY)

## 2023-09-20 PROCEDURE — 1160F PR REVIEW ALL MEDS BY PRESCRIBER/CLIN PHARMACIST DOCUMENTED: ICD-10-PCS | Mod: CPTII,,, | Performed by: THORACIC SURGERY (CARDIOTHORACIC VASCULAR SURGERY)

## 2023-09-20 PROCEDURE — 99024 POSTOP FOLLOW-UP VISIT: CPT | Mod: ,,, | Performed by: THORACIC SURGERY (CARDIOTHORACIC VASCULAR SURGERY)

## 2023-09-20 PROCEDURE — 1159F PR MEDICATION LIST DOCUMENTED IN MEDICAL RECORD: ICD-10-PCS | Mod: CPTII,,, | Performed by: THORACIC SURGERY (CARDIOTHORACIC VASCULAR SURGERY)

## 2023-09-20 PROCEDURE — 1101F PR PT FALLS ASSESS DOC 0-1 FALLS W/OUT INJ PAST YR: ICD-10-PCS | Mod: CPTII,,, | Performed by: THORACIC SURGERY (CARDIOTHORACIC VASCULAR SURGERY)

## 2023-09-20 PROCEDURE — 99024 PR POST-OP FOLLOW-UP VISIT: ICD-10-PCS | Mod: ,,, | Performed by: THORACIC SURGERY (CARDIOTHORACIC VASCULAR SURGERY)

## 2023-09-20 PROCEDURE — 1126F PR PAIN SEVERITY QUANTIFIED, NO PAIN PRESENT: ICD-10-PCS | Mod: CPTII,,, | Performed by: THORACIC SURGERY (CARDIOTHORACIC VASCULAR SURGERY)

## 2023-09-20 NOTE — PROGRESS NOTES
"Sandra Rees is a 89 y.o. male patient.   No diagnosis found.  Past Medical History:   Diagnosis Date    Bleeding ulcer     Carotid disease, bilateral     Chronic kidney failure, stage 3 (moderate)     Cough     Diabetes mellitus     HLD (hyperlipidemia)     Hypercholesteremia     Lightheadedness     Skin cancer of face     SOB (shortness of breath)      No past surgical history pertinent negatives on file.  Scheduled Meds:  Continuous Infusions:  PRN Meds:    Review of patient's allergies indicates:   Allergen Reactions    Benicar [olmesartan] Other (See Comments)     There are no hospital problems to display for this patient.    Blood pressure 120/64, pulse 89, height 5' 4" (1.626 m), weight 72.9 kg (160 lb 12.8 oz), SpO2 97 %.    Subjective:  The patient returns to the clinic status post left carotid endarterectomy has been doing very well.  History reports shortness of breath and he is actually using his wife's oxygen to make him feel better.  His saturation is always in 94 95% even without oxygen      Objective:  His wounds have healed well he is neurologically intact.      Assessment & Plan:  The patient is still require a right carotid endarterectomy however at this point I believe he needs to finish up his workup for shortness of breath with Dr. Hinson.  He was instructed to call me once he is done to schedule his right CEA      Jacquie Maza MD  9/20/2023    "

## 2023-09-28 NOTE — TRANSFER OF CARE
"Anesthesia Transfer of Care Note    Patient: Sandra Rees    Procedure(s) Performed: Procedure(s) (LRB):  ENDARTERECTOMY-CAROTID (Left)    Patient location: PACU    Anesthesia Type: general    Transport from OR: Transported from OR on room air with adequate spontaneous ventilation    Post pain: adequate analgesia    Post assessment: no apparent anesthetic complications    Post vital signs: stable    Level of consciousness: sedated, awake and responds to stimulation    Nausea/Vomiting: no nausea/vomiting    Complications: none    Transfer of care protocol was followed      Last vitals:   Visit Vitals  BP (!) 122/53   Pulse 61   Temp 36.4 °C (97.6 °F) (Oral)   Resp 18   Ht 5' 4" (1.626 m)   Wt 73.9 kg (163 lb)   SpO2 95%   BMI 27.98 kg/m²     "

## 2023-09-29 NOTE — ANESTHESIA POSTPROCEDURE EVALUATION
Anesthesia Post Evaluation    Patient: Sandra Rees    Procedure(s) Performed: Procedure(s) (LRB):  ENDARTERECTOMY-CAROTID (Left)    Final Anesthesia Type: general      Patient location during evaluation: PACU  Patient participation: Yes- Able to Participate  Level of consciousness: awake and alert  Post-procedure vital signs: reviewed and stable  Pain management: adequate  Airway patency: patent      Anesthetic complications: no      Cardiovascular status: hemodynamically stable  Respiratory status: unassisted  Hydration status: euvolemic  Follow-up not needed.          Vitals Value Taken Time   /64 09/20/23 0949   Temp 36.4 °C (97.6 °F) 09/09/23 1130   Pulse 89 09/20/23 0949   Resp 18 09/09/23 1130   SpO2 97 % 09/20/23 0949         Event Time   Out of Recovery 14:00:00         Pain/Mily Score: No data recorded

## 2023-10-20 DIAGNOSIS — I25.10 CORONARY ARTERY DISEASE: Primary | ICD-10-CM

## 2023-10-25 ENCOUNTER — OFFICE VISIT (OUTPATIENT)
Dept: CARDIAC SURGERY | Facility: CLINIC | Age: 88
End: 2023-10-25
Payer: MEDICARE

## 2023-10-25 VITALS
HEART RATE: 98 BPM | BODY MASS INDEX: 30.82 KG/M2 | WEIGHT: 157 LBS | SYSTOLIC BLOOD PRESSURE: 143 MMHG | DIASTOLIC BLOOD PRESSURE: 63 MMHG | HEIGHT: 60 IN

## 2023-10-25 DIAGNOSIS — I25.10 CORONARY ARTERY DISEASE: ICD-10-CM

## 2023-10-25 DIAGNOSIS — I65.23 OCCLUSION AND STENOSIS OF BILATERAL CAROTID ARTERIES: Primary | ICD-10-CM

## 2023-10-25 PROCEDURE — 1101F PT FALLS ASSESS-DOCD LE1/YR: CPT | Mod: CPTII,,, | Performed by: THORACIC SURGERY (CARDIOTHORACIC VASCULAR SURGERY)

## 2023-10-25 PROCEDURE — 3288F FALL RISK ASSESSMENT DOCD: CPT | Mod: CPTII,,, | Performed by: THORACIC SURGERY (CARDIOTHORACIC VASCULAR SURGERY)

## 2023-10-25 PROCEDURE — 1101F PR PT FALLS ASSESS DOC 0-1 FALLS W/OUT INJ PAST YR: ICD-10-PCS | Mod: CPTII,,, | Performed by: THORACIC SURGERY (CARDIOTHORACIC VASCULAR SURGERY)

## 2023-10-25 PROCEDURE — 1159F MED LIST DOCD IN RCRD: CPT | Mod: CPTII,,, | Performed by: THORACIC SURGERY (CARDIOTHORACIC VASCULAR SURGERY)

## 2023-10-25 PROCEDURE — 1160F RVW MEDS BY RX/DR IN RCRD: CPT | Mod: CPTII,,, | Performed by: THORACIC SURGERY (CARDIOTHORACIC VASCULAR SURGERY)

## 2023-10-25 PROCEDURE — 3288F PR FALLS RISK ASSESSMENT DOCUMENTED: ICD-10-PCS | Mod: CPTII,,, | Performed by: THORACIC SURGERY (CARDIOTHORACIC VASCULAR SURGERY)

## 2023-10-25 PROCEDURE — 1159F PR MEDICATION LIST DOCUMENTED IN MEDICAL RECORD: ICD-10-PCS | Mod: CPTII,,, | Performed by: THORACIC SURGERY (CARDIOTHORACIC VASCULAR SURGERY)

## 2023-10-25 PROCEDURE — 1160F PR REVIEW ALL MEDS BY PRESCRIBER/CLIN PHARMACIST DOCUMENTED: ICD-10-PCS | Mod: CPTII,,, | Performed by: THORACIC SURGERY (CARDIOTHORACIC VASCULAR SURGERY)

## 2023-10-25 PROCEDURE — 99214 OFFICE O/P EST MOD 30 MIN: CPT | Mod: 24,,, | Performed by: THORACIC SURGERY (CARDIOTHORACIC VASCULAR SURGERY)

## 2023-10-25 PROCEDURE — 99214 PR OFFICE/OUTPT VISIT, EST, LEVL IV, 30-39 MIN: ICD-10-PCS | Mod: 24,,, | Performed by: THORACIC SURGERY (CARDIOTHORACIC VASCULAR SURGERY)

## 2023-10-25 NOTE — PROGRESS NOTES
History & Physical    SUBJECTIVE:     History of Present Illness:  The patient returns to the clinic for follow-up status post left carotid endarterectomy.  He has been worked up by Dr. Hinson and cardiac catheterization performed revealed evidence of left main coronary artery disease with a LAD disease.  He is here for possible surgical opinion.  He reports shortness of breath on exertion.      Chief Complaint   Patient presents with    Pre-op Exam     CABG evaluation [facility] results       Review of patient's allergies indicates:   Allergen Reactions    Benicar [olmesartan] Other (See Comments)       Current Outpatient Medications   Medication Sig Dispense Refill    aspirin (ECOTRIN) 81 MG EC tablet Take 81 mg by mouth once daily.      clopidogreL (PLAVIX) 75 mg tablet Take 75 mg by mouth once daily.      doxazosin (CARDURA) 4 MG tablet Take 4 mg by mouth.      furosemide (LASIX) 20 MG tablet Take 20 mg by mouth.      glipiZIDE 5 MG TR24 Take 5 mg by mouth.      ONETOUCH ULTRA TEST Strp USE TO CHECK BLOOD GLUCOSE ONCE EVERY DAY      simvastatin (ZOCOR) 40 MG tablet Take 40 mg by mouth every evening.      ferrous gluconate (FERGON) 324 MG tablet Take by mouth.       No current facility-administered medications for this visit.       Past Medical History:   Diagnosis Date    Bleeding ulcer     CAD (coronary artery disease)     Carotid disease, bilateral     Chronic kidney failure, stage 3 (moderate)     Cough     Diabetes mellitus     HLD (hyperlipidemia)     Hypercholesteremia     Lightheadedness     Skin cancer of face     SOB (shortness of breath)      Past Surgical History:   Procedure Laterality Date    CAROTID ENDARTERECTOMY Left 9/8/2023    Procedure: ENDARTERECTOMY-CAROTID;  Surgeon: Jacquie Maza MD;  Location: [facility];  Service: Cardiology;  Laterality: Left;  REQ TF - 900    ESOPHAGOGASTRODUODENOSCOPY      SKIN CANCER EXCISION      multiple     History reviewed. No pertinent family history.  Social History      Tobacco Use    Smoking status: Former     Current packs/day: 0.00     Types: Cigarettes, Pipe     Quit date:      Years since quittin.8    Smokeless tobacco: Former   Substance Use Topics    Alcohol use: Never    Drug use: Never        Review of Systems:  Review of Systems   Constitutional: Negative.    HENT: Negative.     Eyes: Negative.    Respiratory:  Positive for shortness of breath.    Cardiovascular: Negative.    Gastrointestinal: Negative.    Endocrine: Negative.    Genitourinary: Negative.    Musculoskeletal: Negative.         Claudications   Skin: Negative.    Allergic/Immunologic: Negative.    Neurological: Negative.    Hematological: Negative.    Psychiatric/Behavioral: Negative.         OBJECTIVE:     Vital Signs (Most Recent)  Pulse: 98 (10/25/23 0959)  BP: (!) 143/63 (10/25/23 0959)  5' (1.524 m)  71.2 kg (157 lb)     Physical Exam:  Physical Exam  Vitals reviewed.   Constitutional:       Appearance: Normal appearance.   HENT:      Head: Normocephalic and atraumatic.      Nose: Nose normal.      Mouth/Throat:      Mouth: Mucous membranes are dry.      Pharynx: Oropharynx is clear.   Eyes:      Extraocular Movements: Extraocular movements intact.      Conjunctiva/sclera: Conjunctivae normal.      Pupils: Pupils are equal, round, and reactive to light.   Cardiovascular:      Rate and Rhythm: Normal rate and regular rhythm.      Pulses: Normal pulses.   Pulmonary:      Effort: Pulmonary effort is normal.      Breath sounds: Normal breath sounds.   Abdominal:      General: Abdomen is flat.      Palpations: Abdomen is soft.   Musculoskeletal:         General: Normal range of motion.      Cervical back: Neck supple.   Skin:     General: Skin is warm and dry.   Neurological:      General: No focal deficit present.   Psychiatric:         Mood and Affect: Mood normal.         Laboratory:  None      Diagnostic Results:  Cardiac catheterization reviewed      ASSESSMENT/PLAN:     Symptomatic coronary  artery disease.  At this age the patient is really not a good candidate for coronary artery bypass grafting.  I have discussed this case with Dr. Hinson and we will send the patient to Dr. Sidhu for possible high-risk stenting.  He will need a right carotid endarterectomy or stenting at some other time.  I have had a long discussion with the family about expectations given the patient's age and general condition.  They completely understand

## 2023-11-01 ENCOUNTER — TELEPHONE (OUTPATIENT)
Dept: CARDIAC SURGERY | Facility: CLINIC | Age: 88
End: 2023-11-01
Payer: MEDICARE

## 2023-11-01 NOTE — TELEPHONE ENCOUNTER
Pt daughter called stressed about appt with , they had him scheduled for December. I told her we would call to see if we could move it up since he is a high risk.    Spoke to 's office and moved appt to Nov 2, 2023 at 3:20pm at the [facility] location. Pt daughter was called and given new appt.

## 2024-02-07 ENCOUNTER — OFFICE VISIT (OUTPATIENT)
Dept: CARDIAC SURGERY | Facility: CLINIC | Age: 89
End: 2024-02-07
Payer: MEDICARE

## 2024-02-07 ENCOUNTER — HOSPITAL ENCOUNTER (OUTPATIENT)
Dept: RADIOLOGY | Facility: HOSPITAL | Age: 89
Discharge: HOME | End: 2024-02-07
Attending: THORACIC SURGERY (CARDIOTHORACIC VASCULAR SURGERY)
Payer: MEDICARE

## 2024-02-07 VITALS
BODY MASS INDEX: 29.22 KG/M2 | OXYGEN SATURATION: 95 % | HEART RATE: 67 BPM | DIASTOLIC BLOOD PRESSURE: 61 MMHG | WEIGHT: 148.81 LBS | HEIGHT: 60 IN | SYSTOLIC BLOOD PRESSURE: 155 MMHG

## 2024-02-07 DIAGNOSIS — Z51.81 ENCOUNTER FOR MONITORING NSAID THERAPY: ICD-10-CM

## 2024-02-07 DIAGNOSIS — R00.1 BRADYCARDIA: ICD-10-CM

## 2024-02-07 DIAGNOSIS — I65.23 OCCLUSION AND STENOSIS OF BILATERAL CAROTID ARTERIES: Primary | ICD-10-CM

## 2024-02-07 DIAGNOSIS — Z79.1 ENCOUNTER FOR MONITORING NSAID THERAPY: ICD-10-CM

## 2024-02-07 PROCEDURE — 1160F RVW MEDS BY RX/DR IN RCRD: CPT | Mod: CPTII,,, | Performed by: THORACIC SURGERY (CARDIOTHORACIC VASCULAR SURGERY)

## 2024-02-07 PROCEDURE — 71046 X-RAY EXAM CHEST 2 VIEWS: CPT | Mod: TC

## 2024-02-07 PROCEDURE — 1159F MED LIST DOCD IN RCRD: CPT | Mod: CPTII,,, | Performed by: THORACIC SURGERY (CARDIOTHORACIC VASCULAR SURGERY)

## 2024-02-07 PROCEDURE — 99214 OFFICE O/P EST MOD 30 MIN: CPT | Mod: ,,, | Performed by: THORACIC SURGERY (CARDIOTHORACIC VASCULAR SURGERY)

## 2024-02-07 PROCEDURE — 1100F PTFALLS ASSESS-DOCD GE2>/YR: CPT | Mod: CPTII,,, | Performed by: THORACIC SURGERY (CARDIOTHORACIC VASCULAR SURGERY)

## 2024-02-07 PROCEDURE — 3288F FALL RISK ASSESSMENT DOCD: CPT | Mod: CPTII,,, | Performed by: THORACIC SURGERY (CARDIOTHORACIC VASCULAR SURGERY)

## 2024-02-07 RX ORDER — SACUBITRIL AND VALSARTAN 49; 51 MG/1; MG/1
1 TABLET, FILM COATED ORAL 2 TIMES DAILY
COMMUNITY

## 2024-02-07 NOTE — H&P (VIEW-ONLY)
History & Physical    SUBJECTIVE:     History of Present Illness:  The patient is presenting for evaluation for dual-chamber pacemaker.  He has been having episodes of dizziness and syncope associated with the slowing of his heart rate.  He has a long MI interval.  He was sent to me for placement of a pacemaker      Chief Complaint   Patient presents with    Pre-op Exam     REFERRAL-DR. CONTRERAS-SPOKE WITH DR. MILIAN TO ADD ON-DUAL PACEMAKER INSERTION. RECENT ECHO-EF 55%, MILD TR/MR, SYNCOPE, DIZZINESS, WITH TRAUMA EPIDOSE, LIGHTHEADEDNESS. PMH: HTN, PAD, INT CLAUDICATION, BILAT CA STENOSIS, S/P LT CEA, REFERRED TO DR. MORENO FOR STENTING D/T CAD  C/o dizziness and light headedness daily       Review of patient's allergies indicates:   Allergen Reactions    Benicar [olmesartan] Other (See Comments)       Current Outpatient Medications   Medication Sig Dispense Refill    aspirin (ECOTRIN) 81 MG EC tablet Take 81 mg by mouth once daily.      clopidogreL (PLAVIX) 75 mg tablet Take 75 mg by mouth once daily.      doxazosin (CARDURA) 4 MG tablet Take 4 mg by mouth.      ENTRESTO 49-51 mg per tablet Take 1 tablet by mouth 2 (two) times daily.      ferrous gluconate (FERGON) 324 MG tablet Take by mouth.      furosemide (LASIX) 20 MG tablet Take 20 mg by mouth.      glipiZIDE 5 MG TR24 Take 5 mg by mouth.      ONETOUCH ULTRA TEST Strp USE TO CHECK BLOOD GLUCOSE ONCE EVERY DAY      simvastatin (ZOCOR) 40 MG tablet Take 40 mg by mouth every evening.       No current facility-administered medications for this visit.       Past Medical History:   Diagnosis Date    Bleeding ulcer     CAD (coronary artery disease)     Carotid disease, bilateral     Chronic kidney failure, stage 3 (moderate)     Cough     Diabetes mellitus     HLD (hyperlipidemia)     Hypercholesteremia     Lightheadedness     Skin cancer of face     SOB (shortness of breath)      Past Surgical History:   Procedure Laterality Date    CAROTID ENDARTERECTOMY Left  2023    Procedure: ENDARTERECTOMY-CAROTID;  Surgeon: Jacquie Maza MD;  Location: [facility] OR;  Service: Cardiology;  Laterality: Left;  REQ TF - 900    ESOPHAGOGASTRODUODENOSCOPY      SKIN CANCER EXCISION      multiple     History reviewed. No pertinent family history.  Social History     Tobacco Use    Smoking status: Former     Current packs/day: 0.00     Types: Cigarettes, Pipe     Quit date:      Years since quittin.1    Smokeless tobacco: Former   Substance Use Topics    Alcohol use: Never    Drug use: Never        Review of Systems:  Review of Systems   Constitutional: Negative.    HENT: Negative.     Eyes: Negative.    Respiratory: Negative.     Cardiovascular: Negative.    Gastrointestinal: Negative.    Endocrine: Negative.    Genitourinary: Negative.    Musculoskeletal: Negative.         Claudications   Skin: Negative.    Allergic/Immunologic: Negative.    Neurological: Negative.    Hematological: Negative.    Psychiatric/Behavioral: Negative.         OBJECTIVE:     Vital Signs (Most Recent)  Pulse: 67 (24)  BP: (!) 155/61 (24)  SpO2: 95 % (24)  5' (1.524 m)  67.5 kg (148 lb 12.8 oz)     Physical Exam:  Physical Exam  Vitals reviewed.   Constitutional:       Appearance: Normal appearance.   HENT:      Head: Normocephalic and atraumatic.      Nose: Nose normal.      Mouth/Throat:      Mouth: Mucous membranes are dry.      Pharynx: Oropharynx is clear.   Eyes:      Extraocular Movements: Extraocular movements intact.      Conjunctiva/sclera: Conjunctivae normal.      Pupils: Pupils are equal, round, and reactive to light.   Cardiovascular:      Rate and Rhythm: Normal rate and regular rhythm.      Pulses: Normal pulses.   Pulmonary:      Effort: Pulmonary effort is normal.      Breath sounds: Normal breath sounds.   Abdominal:      General: Abdomen is flat.      Palpations: Abdomen is soft.   Musculoskeletal:         General: Normal range of motion.      Cervical  back: Neck supple.   Skin:     General: Skin is warm and dry.   Neurological:      General: No focal deficit present.   Psychiatric:         Mood and Affect: Mood normal.         Laboratory:  None      Diagnostic Results:  None      ASSESSMENT/PLAN:     Prolonged MT interval with sinus bradycardia and pauses.  The patient will benefit from dual-chamber pacemaker.  He understands the risks and benefits of procedure including risk of bleeding and infection.  He elected to proceed

## 2024-02-07 NOTE — PROGRESS NOTES
History & Physical    SUBJECTIVE:     History of Present Illness:  The patient is presenting for evaluation for dual-chamber pacemaker.  He has been having episodes of dizziness and syncope associated with the slowing of his heart rate.  He has a long SC interval.  He was sent to me for placement of a pacemaker      Chief Complaint   Patient presents with    Pre-op Exam     REFERRAL-DR. CONTRERAS-SPOKE WITH DR. MILIAN TO ADD ON-DUAL PACEMAKER INSERTION. RECENT ECHO-EF 55%, MILD TR/MR, SYNCOPE, DIZZINESS, WITH TRAUMA EPIDOSE, LIGHTHEADEDNESS. PMH: HTN, PAD, INT CLAUDICATION, BILAT CA STENOSIS, S/P LT CEA, REFERRED TO DR. MORENO FOR STENTING D/T CAD  C/o dizziness and light headedness daily       Review of patient's allergies indicates:   Allergen Reactions    Benicar [olmesartan] Other (See Comments)       Current Outpatient Medications   Medication Sig Dispense Refill    aspirin (ECOTRIN) 81 MG EC tablet Take 81 mg by mouth once daily.      clopidogreL (PLAVIX) 75 mg tablet Take 75 mg by mouth once daily.      doxazosin (CARDURA) 4 MG tablet Take 4 mg by mouth.      ENTRESTO 49-51 mg per tablet Take 1 tablet by mouth 2 (two) times daily.      ferrous gluconate (FERGON) 324 MG tablet Take by mouth.      furosemide (LASIX) 20 MG tablet Take 20 mg by mouth.      glipiZIDE 5 MG TR24 Take 5 mg by mouth.      ONETOUCH ULTRA TEST Strp USE TO CHECK BLOOD GLUCOSE ONCE EVERY DAY      simvastatin (ZOCOR) 40 MG tablet Take 40 mg by mouth every evening.       No current facility-administered medications for this visit.       Past Medical History:   Diagnosis Date    Bleeding ulcer     CAD (coronary artery disease)     Carotid disease, bilateral     Chronic kidney failure, stage 3 (moderate)     Cough     Diabetes mellitus     HLD (hyperlipidemia)     Hypercholesteremia     Lightheadedness     Skin cancer of face     SOB (shortness of breath)      Past Surgical History:   Procedure Laterality Date    CAROTID ENDARTERECTOMY Left  2023    Procedure: ENDARTERECTOMY-CAROTID;  Surgeon: Jacquie Maza MD;  Location: [facility] OR;  Service: Cardiology;  Laterality: Left;  REQ TF - 900    ESOPHAGOGASTRODUODENOSCOPY      SKIN CANCER EXCISION      multiple     History reviewed. No pertinent family history.  Social History     Tobacco Use    Smoking status: Former     Current packs/day: 0.00     Types: Cigarettes, Pipe     Quit date:      Years since quittin.1    Smokeless tobacco: Former   Substance Use Topics    Alcohol use: Never    Drug use: Never        Review of Systems:  Review of Systems   Constitutional: Negative.    HENT: Negative.     Eyes: Negative.    Respiratory: Negative.     Cardiovascular: Negative.    Gastrointestinal: Negative.    Endocrine: Negative.    Genitourinary: Negative.    Musculoskeletal: Negative.         Claudications   Skin: Negative.    Allergic/Immunologic: Negative.    Neurological: Negative.    Hematological: Negative.    Psychiatric/Behavioral: Negative.         OBJECTIVE:     Vital Signs (Most Recent)  Pulse: 67 (24)  BP: (!) 155/61 (24)  SpO2: 95 % (24)  5' (1.524 m)  67.5 kg (148 lb 12.8 oz)     Physical Exam:  Physical Exam  Vitals reviewed.   Constitutional:       Appearance: Normal appearance.   HENT:      Head: Normocephalic and atraumatic.      Nose: Nose normal.      Mouth/Throat:      Mouth: Mucous membranes are dry.      Pharynx: Oropharynx is clear.   Eyes:      Extraocular Movements: Extraocular movements intact.      Conjunctiva/sclera: Conjunctivae normal.      Pupils: Pupils are equal, round, and reactive to light.   Cardiovascular:      Rate and Rhythm: Normal rate and regular rhythm.      Pulses: Normal pulses.   Pulmonary:      Effort: Pulmonary effort is normal.      Breath sounds: Normal breath sounds.   Abdominal:      General: Abdomen is flat.      Palpations: Abdomen is soft.   Musculoskeletal:         General: Normal range of motion.      Cervical  back: Neck supple.   Skin:     General: Skin is warm and dry.   Neurological:      General: No focal deficit present.   Psychiatric:         Mood and Affect: Mood normal.         Laboratory:  None      Diagnostic Results:  None      ASSESSMENT/PLAN:     Prolonged NM interval with sinus bradycardia and pauses.  The patient will benefit from dual-chamber pacemaker.  He understands the risks and benefits of procedure including risk of bleeding and infection.  He elected to proceed

## 2024-02-08 ENCOUNTER — ANESTHESIA EVENT (OUTPATIENT)
Dept: SURGERY | Facility: HOSPITAL | Age: 89
DRG: 244 | End: 2024-02-08
Payer: MEDICARE

## 2024-02-08 RX ORDER — DOCUSATE SODIUM 100 MG/1
100 CAPSULE, LIQUID FILLED ORAL DAILY
COMMUNITY

## 2024-02-12 NOTE — PRE-PROCEDURE INSTRUCTIONS
"[facility]: Outpatient Surgery  Preprocedure Instructions    Your physician's office will be calling you with your arrival time.      Expectations: "Because of inconsistent procedure completion times, an unexpected wait may occur. The Physicians would like you to be here to prepare in the event they run ahead of time. We will make you as comfortable as possible and keep you informed. We apologize in advance if this happens."    You will arrive at [facility], [address], [place].  Enter through the [place] entrance next to the Emergency Room, and come to the 6th floor to the Outpatient Surgery Department.     Visitory Policy:  You are allowed 2 adult visitors to be with you in the hospital. Please, no switching visitors in pre-op area. All hospital visitors should be in good current health.  No small children.     What to Bring:  Please have your ID, insurance cards, and all home medication bottles with you at check in.  Bring your CPAP machine if one is used at home.     Fasting:  Nothing to eat or drink after midnight the night before your procedure. This includes no ice, gum, hard candies, and/or tobacco products.    Medications:  Follow your doctor's instructions for taking any medications on the morning of your procedure.  If no instructions for taking medications were given, do not take any medications but bring your medications in their bottles to your procedure check in.     Follow your doctor's preoperative instructions regarding skin prep, bowel prep, bathing, or showering prior to your procedure.  If any special soaps were provided to you, please use according to your doctor's instructions. If no instructions were given from your doctor, take a good bath or shower with antibacterial soap the night before and the morning of your procedure.  On the morning of procedure, wear loose, comfortable clothing.  No lotions, makeup, perfumes, colognes, deodorant, or " jewelry to your procedure.  Removable items (glasses, contact lenses, dentures, retainers, hearing aids) need to be removed for your procedure.  Bring your storage containers for these items if you wear them.     Artificial nails, body jewelry, eyelash extensions, and/or hair extensions with metal clips are not allowed during your surgery.  If you currently wear any of these items, please arrange for them to be removed prior to your arrival to the hospital.     Outpatient or Same Day Surgeries:  Any patients receiving sedation/anesthesia are advised not to drive for 24 hours after their procedure.  We do not allow patients to drive themselves home after discharge.  If you are going home after your procedure, please have someone available to drive you home from the hospital.        You may call the Outpatient Surgery Department at [phone] with any questions or concerns.  We are looking forward to meeting you and taking great care of you for your procedure.  Thank you for choosing [facility] for your surgical needs.

## 2024-02-14 ENCOUNTER — HOSPITAL ENCOUNTER (INPATIENT)
Facility: HOSPITAL | Age: 89
LOS: 1 days | Discharge: HOME HEALTH | DRG: 244 | End: 2024-02-15
Attending: THORACIC SURGERY (CARDIOTHORACIC VASCULAR SURGERY) | Admitting: THORACIC SURGERY (CARDIOTHORACIC VASCULAR SURGERY)
Payer: MEDICARE

## 2024-02-14 ENCOUNTER — ANESTHESIA (OUTPATIENT)
Dept: SURGERY | Facility: HOSPITAL | Age: 89
DRG: 244 | End: 2024-02-14
Payer: MEDICARE

## 2024-02-14 DIAGNOSIS — R00.1 BRADYCARDIA: ICD-10-CM

## 2024-02-14 DIAGNOSIS — I65.23 OCCLUSION AND STENOSIS OF BILATERAL CAROTID ARTERIES: Primary | ICD-10-CM

## 2024-02-14 LAB
GLUCOSE SERPL-MCNC: 137 MG/DL (ref 70–110)
POCT GLUCOSE: 137 MG/DL (ref 70–110)
POCT GLUCOSE: 95 MG/DL (ref 70–110)

## 2024-02-14 PROCEDURE — 33208 INSRT HEART PM ATRIAL & VENT: CPT | Mod: ,,, | Performed by: THORACIC SURGERY (CARDIOTHORACIC VASCULAR SURGERY)

## 2024-02-14 PROCEDURE — 37000009 HC ANESTHESIA EA ADD 15 MINS: Performed by: THORACIC SURGERY (CARDIOTHORACIC VASCULAR SURGERY)

## 2024-02-14 PROCEDURE — 71000039 HC RECOVERY, EACH ADD'L HOUR: Performed by: THORACIC SURGERY (CARDIOTHORACIC VASCULAR SURGERY)

## 2024-02-14 PROCEDURE — 82962 GLUCOSE BLOOD TEST: CPT | Mod: 91 | Performed by: THORACIC SURGERY (CARDIOTHORACIC VASCULAR SURGERY)

## 2024-02-14 PROCEDURE — 11000001 HC ACUTE MED/SURG PRIVATE ROOM

## 2024-02-14 PROCEDURE — 25000003 PHARM REV CODE 250

## 2024-02-14 PROCEDURE — 02H63JZ INSERTION OF PACEMAKER LEAD INTO RIGHT ATRIUM, PERCUTANEOUS APPROACH: ICD-10-PCS | Performed by: THORACIC SURGERY (CARDIOTHORACIC VASCULAR SURGERY)

## 2024-02-14 PROCEDURE — 71000033 HC RECOVERY, INTIAL HOUR: Performed by: THORACIC SURGERY (CARDIOTHORACIC VASCULAR SURGERY)

## 2024-02-14 PROCEDURE — C1883 ADAPT/EXT, PACING/NEURO LEAD: HCPCS | Performed by: THORACIC SURGERY (CARDIOTHORACIC VASCULAR SURGERY)

## 2024-02-14 PROCEDURE — D9220A PRA ANESTHESIA: Mod: ANES,,, | Performed by: ANESTHESIOLOGY

## 2024-02-14 PROCEDURE — 25000003 PHARM REV CODE 250: Performed by: THORACIC SURGERY (CARDIOTHORACIC VASCULAR SURGERY)

## 2024-02-14 PROCEDURE — 82962 GLUCOSE BLOOD TEST: CPT | Performed by: THORACIC SURGERY (CARDIOTHORACIC VASCULAR SURGERY)

## 2024-02-14 PROCEDURE — 63600175 PHARM REV CODE 636 W HCPCS: Mod: JZ,JG

## 2024-02-14 PROCEDURE — C1898 LEAD, PMKR, OTHER THAN TRANS: HCPCS | Performed by: THORACIC SURGERY (CARDIOTHORACIC VASCULAR SURGERY)

## 2024-02-14 PROCEDURE — 27000221 HC OXYGEN, UP TO 24 HOURS

## 2024-02-14 PROCEDURE — 37000008 HC ANESTHESIA 1ST 15 MINUTES: Performed by: THORACIC SURGERY (CARDIOTHORACIC VASCULAR SURGERY)

## 2024-02-14 PROCEDURE — 71000015 HC POSTOP RECOV 1ST HR: Performed by: THORACIC SURGERY (CARDIOTHORACIC VASCULAR SURGERY)

## 2024-02-14 PROCEDURE — 36000707: Performed by: THORACIC SURGERY (CARDIOTHORACIC VASCULAR SURGERY)

## 2024-02-14 PROCEDURE — 36000706: Performed by: THORACIC SURGERY (CARDIOTHORACIC VASCULAR SURGERY)

## 2024-02-14 PROCEDURE — C1785 PMKR, DUAL, RATE-RESP: HCPCS | Performed by: THORACIC SURGERY (CARDIOTHORACIC VASCULAR SURGERY)

## 2024-02-14 PROCEDURE — D9220A PRA ANESTHESIA: Mod: CRNA,,,

## 2024-02-14 PROCEDURE — 02HK3JZ INSERTION OF PACEMAKER LEAD INTO RIGHT VENTRICLE, PERCUTANEOUS APPROACH: ICD-10-PCS | Performed by: THORACIC SURGERY (CARDIOTHORACIC VASCULAR SURGERY)

## 2024-02-14 PROCEDURE — 0JH606Z INSERTION OF PACEMAKER, DUAL CHAMBER INTO CHEST SUBCUTANEOUS TISSUE AND FASCIA, OPEN APPROACH: ICD-10-PCS | Performed by: THORACIC SURGERY (CARDIOTHORACIC VASCULAR SURGERY)

## 2024-02-14 PROCEDURE — 63600175 PHARM REV CODE 636 W HCPCS: Performed by: THORACIC SURGERY (CARDIOTHORACIC VASCULAR SURGERY)

## 2024-02-14 DEVICE — IMPLANTABLE DEVICE: Type: IMPLANTABLE DEVICE | Site: CHEST | Status: FUNCTIONAL

## 2024-02-14 RX ORDER — MUPIROCIN 20 MG/G
1 OINTMENT TOPICAL 2 TIMES DAILY
Status: DISCONTINUED | OUTPATIENT
Start: 2024-02-14 | End: 2024-02-15 | Stop reason: HOSPADM

## 2024-02-14 RX ORDER — GLYCOPYRROLATE 0.2 MG/ML
INJECTION INTRAMUSCULAR; INTRAVENOUS
Status: DISCONTINUED | OUTPATIENT
Start: 2024-02-14 | End: 2024-02-14

## 2024-02-14 RX ORDER — EPHEDRINE SULFATE 50 MG/ML
INJECTION, SOLUTION INTRAVENOUS
Status: DISCONTINUED | OUTPATIENT
Start: 2024-02-14 | End: 2024-02-14

## 2024-02-14 RX ORDER — DIPHENHYDRAMINE HYDROCHLORIDE 50 MG/ML
25 INJECTION INTRAMUSCULAR; INTRAVENOUS EVERY 6 HOURS PRN
Status: DISCONTINUED | OUTPATIENT
Start: 2024-02-14 | End: 2024-02-14 | Stop reason: HOSPADM

## 2024-02-14 RX ORDER — HYDROCODONE BITARTRATE AND ACETAMINOPHEN 5; 325 MG/1; MG/1
1 TABLET ORAL EVERY 4 HOURS PRN
Status: DISCONTINUED | OUTPATIENT
Start: 2024-02-14 | End: 2024-02-15 | Stop reason: HOSPADM

## 2024-02-14 RX ORDER — OXYCODONE HYDROCHLORIDE 5 MG/1
5 TABLET ORAL
Status: DISCONTINUED | OUTPATIENT
Start: 2024-02-14 | End: 2024-02-14 | Stop reason: HOSPADM

## 2024-02-14 RX ORDER — VASOPRESSIN 20 [USP'U]/ML
INJECTION, SOLUTION INTRAMUSCULAR; SUBCUTANEOUS
Status: DISCONTINUED | OUTPATIENT
Start: 2024-02-14 | End: 2024-02-14

## 2024-02-14 RX ORDER — ONDANSETRON HYDROCHLORIDE 2 MG/ML
4 INJECTION, SOLUTION INTRAVENOUS DAILY PRN
Status: DISCONTINUED | OUTPATIENT
Start: 2024-02-14 | End: 2024-02-14 | Stop reason: HOSPADM

## 2024-02-14 RX ORDER — LIDOCAINE HYDROCHLORIDE 20 MG/ML
INJECTION INTRAVENOUS
Status: DISCONTINUED | OUTPATIENT
Start: 2024-02-14 | End: 2024-02-14

## 2024-02-14 RX ORDER — PROPOFOL 10 MG/ML
VIAL (ML) INTRAVENOUS CONTINUOUS PRN
Status: DISCONTINUED | OUTPATIENT
Start: 2024-02-14 | End: 2024-02-14

## 2024-02-14 RX ORDER — FENTANYL CITRATE 50 UG/ML
INJECTION, SOLUTION INTRAMUSCULAR; INTRAVENOUS
Status: DISCONTINUED | OUTPATIENT
Start: 2024-02-14 | End: 2024-02-14

## 2024-02-14 RX ORDER — ONDANSETRON 4 MG/1
8 TABLET, ORALLY DISINTEGRATING ORAL EVERY 8 HOURS PRN
Status: DISCONTINUED | OUTPATIENT
Start: 2024-02-14 | End: 2024-02-15 | Stop reason: HOSPADM

## 2024-02-14 RX ORDER — LIDOCAINE HYDROCHLORIDE 10 MG/ML
INJECTION INFILTRATION; PERINEURAL
Status: DISCONTINUED | OUTPATIENT
Start: 2024-02-14 | End: 2024-02-14 | Stop reason: HOSPADM

## 2024-02-14 RX ORDER — HYDROMORPHONE HYDROCHLORIDE 2 MG/ML
0.2 INJECTION, SOLUTION INTRAMUSCULAR; INTRAVENOUS; SUBCUTANEOUS EVERY 5 MIN PRN
Status: DISCONTINUED | OUTPATIENT
Start: 2024-02-14 | End: 2024-02-14 | Stop reason: HOSPADM

## 2024-02-14 RX ORDER — METOCLOPRAMIDE HYDROCHLORIDE 5 MG/ML
5 INJECTION INTRAMUSCULAR; INTRAVENOUS EVERY 6 HOURS PRN
Status: DISCONTINUED | OUTPATIENT
Start: 2024-02-14 | End: 2024-02-15 | Stop reason: HOSPADM

## 2024-02-14 RX ADMIN — EPHEDRINE SULFATE 15 MG: 50 INJECTION INTRAVENOUS at 07:02

## 2024-02-14 RX ADMIN — EPHEDRINE SULFATE 5 MG: 50 INJECTION INTRAVENOUS at 08:02

## 2024-02-14 RX ADMIN — DEXTROSE MONOHYDRATE 1.5 G: 5 INJECTION INTRAVENOUS at 07:02

## 2024-02-14 RX ADMIN — VASOPRESSIN 1 UNITS: 20 INJECTION INTRAVENOUS at 08:02

## 2024-02-14 RX ADMIN — EPHEDRINE SULFATE 10 MG: 50 INJECTION INTRAVENOUS at 07:02

## 2024-02-14 RX ADMIN — EPHEDRINE SULFATE 10 MG: 50 INJECTION INTRAVENOUS at 08:02

## 2024-02-14 RX ADMIN — LIDOCAINE HYDROCHLORIDE 80 MG: 20 INJECTION INTRAVENOUS at 07:02

## 2024-02-14 RX ADMIN — FENTANYL CITRATE 25 MCG: 50 INJECTION, SOLUTION INTRAMUSCULAR; INTRAVENOUS at 08:02

## 2024-02-14 RX ADMIN — FENTANYL CITRATE 50 MCG: 50 INJECTION, SOLUTION INTRAMUSCULAR; INTRAVENOUS at 07:02

## 2024-02-14 RX ADMIN — FENTANYL CITRATE 25 MCG: 50 INJECTION, SOLUTION INTRAMUSCULAR; INTRAVENOUS at 07:02

## 2024-02-14 RX ADMIN — SODIUM CHLORIDE, SODIUM GLUCONATE, SODIUM ACETATE, POTASSIUM CHLORIDE AND MAGNESIUM CHLORIDE: 526; 502; 368; 37; 30 INJECTION, SOLUTION INTRAVENOUS at 07:02

## 2024-02-14 RX ADMIN — GLYCOPYRROLATE 0.2 MG: 0.2 INJECTION INTRAMUSCULAR; INTRAVENOUS at 07:02

## 2024-02-14 RX ADMIN — PROPOFOL 100 MCG/KG/MIN: 10 INJECTION, EMULSION INTRAVENOUS at 07:02

## 2024-02-14 NOTE — PROGRESS NOTES
Contacted family by phone at this time to see about getting a family member in recovery area to see pt.

## 2024-02-14 NOTE — TRANSFER OF CARE
"Anesthesia Transfer of Care Note    Patient: Sandra Rees    Procedure(s) Performed: Procedure(s) (LRB):  INSERTION, PACEMAKER (N/A)    Patient location: PACU    Anesthesia Type: general    Transport from OR: Transported from OR on room air with adequate spontaneous ventilation    Post pain: adequate analgesia    Post assessment: no apparent anesthetic complications and tolerated procedure well    Post vital signs: stable    Level of consciousness: sedated    Nausea/Vomiting: no nausea/vomiting    Complications: none    Transfer of care protocol was followed      Last vitals: Visit Vitals  BP (!) 155/69   Pulse (!) 56   Temp 36.6 °C (97.8 °F) (Oral)   Resp 18   Ht 5' 4" (1.626 m)   Wt 65.7 kg (144 lb 13.5 oz)   SpO2 97%   BMI 24.86 kg/m²     "

## 2024-02-14 NOTE — OP NOTE
[facility]                       [address] [place] [id number]    PATIENT NAME:      ESPERANZA KIM   YOB: 1930  CSN:               [id number]  MRN:               [id number]  ADMIT DATE:        02/14/2024 04:59:00  PHYSICIAN:         Jacquie Maza MD                          OPERATIVE REPORT      DATE OF SURGERY:    02/14/2024 00:00:00    SURGEON:  Jacquie Maza MD    PREOPERATIVE DIAGNOSIS:  Sinus pause and need for dual chamber pacemaker.    PROCEDURE:  Placement of left subclavian vein dual chamber pacemaker.    POSTOPERATIVE DIAGNOSIS:  Sinus pause and need for dual chamber pacemaker.    ASSISTANT:  MARIA Pearl.    BLOOD LOSS:  Minimal.    TECHNIQUE:  Under informed consent, the patient was taken to the OR, put in   supine position.  MAC anesthesia was given.  Skin over the chest was prepped and   draped in usual sterile fashion.  Local anesthesia was given.  I gained access   to subclavian vein with advancement of the wire under fluoroscopic guidance to   the IVC.  Two wires were placed.  A pocket was made.  The sheaths were advanced   over the wire following removal of the dilator and the wire.  Both leads were   placed with good positioning.  They are connected to the pacemaker.  This was   functioning very well.  The wounds were irrigated and closed in layers with   absorbable sutures.        ______________________________  Jacquie Maza MD    MAA/AQS  DD:  02/14/2024  Time:  12:25PM  DT:  02/14/2024  Time:  12:41PM  Job #:  [id number]      OPERATIVE REPORT

## 2024-02-14 NOTE — ANESTHESIA POSTPROCEDURE EVALUATION
Anesthesia Post Evaluation    Patient: Sandra Rees    Procedure(s) Performed: Procedure(s) (LRB):  INSERTION, PACEMAKER (N/A)    Final Anesthesia Type: MAC      Patient location during evaluation: PACU  Patient participation: Yes- Able to Participate  Level of consciousness: awake and alert  Post-procedure vital signs: reviewed and stable  Pain management: adequate  Airway patency: patent  JAIR mitigation strategies: Multimodal analgesia  PONV status at discharge: No PONV  Anesthetic complications: no      Cardiovascular status: blood pressure returned to baseline  Respiratory status: unassisted  Hydration status: euvolemic  Follow-up not needed.              Vitals Value Taken Time   /72 02/14/24 0901   Temp 97.6 02/14/24 0904   Pulse 90 02/14/24 0902   Resp 18 02/14/24 0902   SpO2 95 % 02/14/24 0902   Vitals shown include unvalidated device data.      No case tracking events are documented in the log.      Pain/Mily Score: Mily Score: 8 (2/14/2024  8:52 AM)

## 2024-02-14 NOTE — ANESTHESIA PREPROCEDURE EVALUATION
02/14/2024  Sandra Rees is a 89 y.o., male.    The patient is presenting for evaluation for dual-chamber pacemaker. He has been having episodes of dizziness and syncope associated with the slowing of his heart rate. He has a long OK interval. He was sent to me for placement of a pacemaker     2/6/24 TTE; EF 55%, mild MR and TR    Pre-op Assessment    I have reviewed the Patient Summary Reports.     I have reviewed the Nursing Notes. I have reviewed the NPO Status.   I have reviewed the Medications.     Review of Systems  Anesthesia Hx:  No problems with previous Anesthesia                Social:  Former Smoker       Hematology/Oncology:       -- Anemia:                                  Cardiovascular:  Exercise tolerance: poor      CAD    Dysrhythmias (Bradycardia with Syncope)      PVD (S/P Left Carotid endarterectomy)                              Pulmonary:      Shortness of breath                  Renal/:  Chronic Renal Disease, CKD                Musculoskeletal:  Arthritis               Neurological:   CVA                                    Endocrine:  Diabetes               Physical Exam  General: Cooperative, Alert and Oriented    Airway:  Mallampati: III   Mouth Opening: Small, but > 3cm  TM Distance: Normal  Tongue: Normal  Neck ROM: Extension Decreased    Dental:  Dentures        Anesthesia Plan  Type of Anesthesia, risks & benefits discussed:    Anesthesia Type: Gen Natural Airway, MAC  Intra-op Monitoring Plan: Standard ASA Monitors  Post Op Pain Control Plan: multimodal analgesia  Informed Consent: Informed consent signed with the Patient and all parties understand the risks and agree with anesthesia plan.  All questions answered.   ASA Score: 3  Day of Surgery Review of History & Physical: H&P Update referred to the surgeon/provider.H&P completed by Anesthesiologist.    Ready For Surgery From  Anesthesia Perspective.     .

## 2024-02-15 VITALS
HEIGHT: 64 IN | OXYGEN SATURATION: 95 % | SYSTOLIC BLOOD PRESSURE: 137 MMHG | DIASTOLIC BLOOD PRESSURE: 68 MMHG | HEART RATE: 64 BPM | TEMPERATURE: 98 F | BODY MASS INDEX: 24.72 KG/M2 | RESPIRATION RATE: 18 BRPM | WEIGHT: 144.81 LBS

## 2024-02-15 PROCEDURE — 25000003 PHARM REV CODE 250: Performed by: PHYSICIAN ASSISTANT

## 2024-02-15 PROCEDURE — 99024 POSTOP FOLLOW-UP VISIT: CPT | Mod: ,,, | Performed by: PHYSICIAN ASSISTANT

## 2024-02-15 RX ORDER — ONDANSETRON 4 MG/1
4 TABLET, ORALLY DISINTEGRATING ORAL EVERY 12 HOURS PRN
Qty: 20 TABLET | Refills: 0 | Status: SHIPPED | OUTPATIENT
Start: 2024-02-15

## 2024-02-15 RX ADMIN — MUPIROCIN 1 G: 20 OINTMENT TOPICAL at 09:02

## 2024-02-15 NOTE — DISCHARGE SUMMARY
[facility] - [facility] Medical Telemetry  Cardiothoracic Surgery  Discharge Summary      Patient Name: Sandra Rees  MRN: [id number]  Admission Date: 2/14/2024  Hospital Length of Stay: 1 days  Discharge Date and Time:  02/15/2024 9:57 AM  Attending Physician: Jacquie Maza MD   Discharging Provider: MARIA Jimenez  Primary Care Provider: Luis Shah MD    HPI:   No notes on file    Procedure(s) (LRB):  INSERTION, PACEMAKER (N/A)      Indwelling Lines/Drains at time of discharge:   Lines/Drains/Airways       None                 Hospital Course: No notes on file    Goals of Care Treatment Preferences:             Significant Diagnostic Studies: Labs: All labs within the past 24 hours have been reviewed    Pending Diagnostic Studies:       None            No new Assessment & Plan notes have been filed under this hospital service since the last note was generated.  Service: Cardiothoracic Surgery    Final Active Diagnoses:    Diagnosis Date Noted POA    PRINCIPAL PROBLEM:  Occlusion and stenosis of bilateral carotid arteries [I65.23] 09/09/2023 Yes      Problems Resolved During this Admission:      Discharged Condition: good    Disposition: Home or Self Care    Follow Up:   Contact information for after-discharge care       Home Medical Care       [facility]    Service: Home Health Services  Contact information:  [address] [place] [id number] [phone]                                 Patient Instructions:      SUBSEQUENT HOME HEALTH ORDERS   Order Comments: Arrange Home Health services; eval and treat for all disciplines   PCP:  Dr. Luis Shah     Order Specific Question Answer Comments   What Home Health Agency is the patient currently using? Other/External      Medications:  Reconciled Home Medications:      Medication List        START taking these medications      ondansetron 4 MG Tbdl  Commonly known as: ZOFRAN-ODT  Take 1 tablet (4 mg total) by  mouth every 12 (twelve) hours as needed (n/v).            CONTINUE taking these medications      clopidogreL 75 mg tablet  Commonly known as: PLAVIX  Take 75 mg by mouth once daily.     COLACE 100 MG capsule  Generic drug: docusate sodium  Take 100 mg by mouth Daily.     ENTRESTO 49-51 mg per tablet  Generic drug: sacubitriL-valsartan  Take 1 tablet by mouth 2 (two) times daily.     furosemide 20 MG tablet  Commonly known as: LASIX  Take 20 mg by mouth every other day.     glipiZIDE 5 MG Tr24  Take 5 mg by mouth daily with breakfast.     ONETOUCH ULTRA TEST Strp  Generic drug: blood sugar diagnostic  USE TO CHECK BLOOD GLUCOSE ONCE EVERY DAY     simvastatin 40 MG tablet  Commonly known as: ZOCOR  Take 40 mg by mouth every evening.            STOP taking these medications      aspirin 81 MG EC tablet  Commonly known as: ECOTRIN     doxazosin 4 MG tablet  Commonly known as: CARDURA     ferrous gluconate 324 MG tablet  Commonly known as: FERGON            Time spent on the discharge of patient: 33 minutes    MARIA Jimenez  Cardiothoracic Surgery  [facility] - [facility] Tele[facility]

## 2024-02-15 NOTE — PLAN OF CARE
02/15/24 0917   Discharge Assessment   Assessment Type Discharge Planning Assessment   Confirmed/corrected address, phone number and insurance Yes   Confirmed Demographics Correct on Facesheet   Source of Information patient;family  (Daughter: Dee Dee Walden: [phone])   Communicated EFRAIN with patient/caregiver Date not available/Unable to determine   Reason For Admission History of Present Illness:  The patient is presenting for evaluation for dual-chamber pacemaker.  He has been having episodes of dizziness and syncope associated with the slowing of his heart rate.  He has a long NE interval.  He was sent to me for placement of a pacemaker   People in Home spouse;child(armando), adult  (The patient lives with his wife: Yoanna Rees: [phone] and his daughter: Luisana Rees: [phone] in his private residence.)   Facility [facility] From: [facility].   Do you expect to return to your current living situation? Yes   Do you have help at home or someone to help you manage your care at home? Yes   Who are your caregiver(s) and their phone number(s)? The patient lives with his wife: Yoanna Rees: [phone] and his daughter: Luisana Rees: [phone] in his private residence.   Prior to hospitilization cognitive status: Alert/Oriented   Current cognitive status: Alert/Oriented   Walking or Climbing Stairs Difficulty yes   Walking or Climbing Stairs ambulation difficulty, requires equipment   Mobility Management The patient uses a rollator/rolling walker and electric lift recliner for mobility concerns.   Dressing/Bathing Difficulty yes   Dressing/Bathing bathing difficulty, assistance 1 person   Dressing/Bathing Management The patient lives with his wife: Yoanna Rees: [phone] and his daughter: Luisana Rees: [phone] in his private residence.   Home Accessibility wheelchair accessible   Home Layout Able to live on 1st floor   Equipment Currently Used at Home walker, rolling;rollator;grab  bar;bath bench;raised toilet;glucometer   Readmission within 30 days? No   Patient currently being followed by outpatient case management? No   Do you currently have service(s) that help you manage your care at home? No   Do you take prescription medications? Yes   Do you have prescription coverage? Yes   Coverage Payor: [employer] MGD MEDICARE - [facility] -   Do you have any problems affording any of your prescribed medications? No   Who is going to help you get home at discharge? The patient lives with his wife: Yoanna Rees: [phone] and his daughter: Luisana Rees: [phone] in his private residence.   How do you get to doctors appointments? family or friend will provide   Are you on dialysis? No   Do you take coumadin? No   Discharge Plan A Home Health  (FOC: Referral was sent to [facility] for home health services as per patient and daughter's request (Dee Dee Walden).)   Discharge Plan B Home Health   DME Needed Upon Discharge  none   Discharge Plan discussed with: Patient;Adult children  (Daughter: Dee Dee Walden: [phone])   Transition of Care Barriers None   Social Connections   In a typical week, how many times do you talk on the phone with family, friends, or neighbors? Twice a week   How often do you get together with friends or relatives? Twice   How often do you attend Rastafarian or Cheondoism services? 1 to 4   Do you belong to any clubs or organizations such as Rastafarian groups, unions, fraternal or athletic groups, or school groups? 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 More than 4   Are you , , , , never , or living with a partner?    Alcohol Use   Q1: How often do you have a drink containing alcohol? Never   Q2: How many drinks containing alcohol do you have on a typical day when you are drinking? None   Q3: How often do  you have six or more drinks on one occasion? Never   OTHER   Name(s) of People in Home The patient lives with his wife: Yoanna Rees: [phone] and his daughter: Luisana Rees: [phone] in his private residence.

## 2024-02-15 NOTE — NURSING
Nurses Note -- 4 Eyes      2/15/2024   7:24 AM      Skin assessed during: Admit      [x] No Altered Skin Integrity Present    [x]Prevention Measures Documented      [] Yes- Altered Skin Integrity Present or Discovered   [] LDA Added if Not in Epic (Describe Wound)   [] New Altered Skin Integrity was Present on Admit and Documented in LDA   [] Wound Image Taken    Wound Care Consulted? No    Attending Nurse:  Diamante Tucker RN/Staff Member:   Flip

## 2024-02-15 NOTE — NURSING
Discharge orders, follow up appts and home health information reviewed with patient and patient daughter. Verbalized understanding. IV and tele discontinued. Pt going home with daughter via POV for self care with HH.

## 2024-02-15 NOTE — PLAN OF CARE
02/15/24 1022   Final Note   Assessment Type Final Discharge Note   Anticipated Discharge Disposition Home-Health  (FOC: The patient will receive home health services through [facility]   Hospital Resources/Appts/Education Provided Appointments scheduled and added to AVS   Post-Acute Status   Post-Acute Authorization Home Health   Home Health Status Set-up Complete/Auth obtained   Coverage Payor: MARY D MEDICARE - [facility] -   Patient choice form signed by patient/caregiver List with quality metrics by geographic area provided   Discharge Delays None known at this time     The patient will be discharged from [facility] to his private residence with home health services through [facility] Clinical notes/updates and AVS and D/C Summary were uploaded and sent via [employer] for review. The patient and his daughter: (Dee Dee Walden) were informed of the above discharge plans and are in agreement. Transportation will be provided by his daughter (Dee Dee Walden) via personal vehicle.

## 2024-02-16 ENCOUNTER — PATIENT OUTREACH (OUTPATIENT)
Dept: ADMINISTRATIVE | Facility: CLINIC | Age: 89
End: 2024-02-16
Payer: MEDICARE

## 2024-02-16 NOTE — PROGRESS NOTES
C3 nurse attempted to contact Sandra Rees  for a TCC post hospital discharge follow up call. No answer. Left message.The patient does not have a scheduled HOSFU appointment, and the pt does not have an [facility] PCP.

## 2024-02-19 NOTE — PROGRESS NOTES
C3 nurse attempted to contact Sandra Rees  for a TCC post hospital discharge follow up call. The patient is unable to conduct the call @ this time. The patient wife stated to contact pt in one hour.

## 2024-02-26 ENCOUNTER — TELEPHONE (OUTPATIENT)
Dept: CARDIAC SURGERY | Facility: CLINIC | Age: 89
End: 2024-02-26
Payer: MEDICARE

## 2024-02-26 NOTE — TELEPHONE ENCOUNTER
Deferred back to PCP because this is ongoing problem for pt with having decreased appetite.  No major significant weight loss at this time but family states he doesn't take in a lot of food or calories.  Suggestions made for diet, offer snack foods, supplements between meals and name of appetite med to discuss with PCP at appt this week.  Pt to f/u with Dr. Maza for post op on 3/6/24 and will discuss then.    ----- Message from Haseeb Freeman sent at 2/26/2024  1:05 PM CST -----  Regarding: questions  Contact: Daughter Dior [phone]  Concerns about her father not eating and other questions

## 2024-03-06 ENCOUNTER — OFFICE VISIT (OUTPATIENT)
Dept: CARDIAC SURGERY | Facility: CLINIC | Age: 89
End: 2024-03-06
Payer: MEDICARE

## 2024-03-06 VITALS
DIASTOLIC BLOOD PRESSURE: 51 MMHG | HEIGHT: 64 IN | BODY MASS INDEX: 23.73 KG/M2 | HEART RATE: 60 BPM | OXYGEN SATURATION: 95 % | WEIGHT: 139 LBS | SYSTOLIC BLOOD PRESSURE: 130 MMHG

## 2024-03-06 DIAGNOSIS — R00.1 BRADYCARDIA: Primary | ICD-10-CM

## 2024-03-06 PROCEDURE — 3288F FALL RISK ASSESSMENT DOCD: CPT | Mod: CPTII,,, | Performed by: THORACIC SURGERY (CARDIOTHORACIC VASCULAR SURGERY)

## 2024-03-06 PROCEDURE — 99024 POSTOP FOLLOW-UP VISIT: CPT | Mod: ,,, | Performed by: THORACIC SURGERY (CARDIOTHORACIC VASCULAR SURGERY)

## 2024-03-06 PROCEDURE — 1160F RVW MEDS BY RX/DR IN RCRD: CPT | Mod: CPTII,,, | Performed by: THORACIC SURGERY (CARDIOTHORACIC VASCULAR SURGERY)

## 2024-03-06 PROCEDURE — 1101F PT FALLS ASSESS-DOCD LE1/YR: CPT | Mod: CPTII,,, | Performed by: THORACIC SURGERY (CARDIOTHORACIC VASCULAR SURGERY)

## 2024-03-06 PROCEDURE — 1126F AMNT PAIN NOTED NONE PRSNT: CPT | Mod: CPTII,,, | Performed by: THORACIC SURGERY (CARDIOTHORACIC VASCULAR SURGERY)

## 2024-03-06 PROCEDURE — 1159F MED LIST DOCD IN RCRD: CPT | Mod: CPTII,,, | Performed by: THORACIC SURGERY (CARDIOTHORACIC VASCULAR SURGERY)

## 2024-03-06 RX ORDER — CYPROHEPTADINE HYDROCHLORIDE 4 MG/1
4 TABLET ORAL 2 TIMES DAILY
COMMUNITY
Start: 2024-02-28

## 2024-03-06 NOTE — PROGRESS NOTES
"Sandra Rees is a 89 y.o. male patient.   No diagnosis found.  Past Medical History:   Diagnosis Date    Bleeding ulcer     Bradycardia     CAD (coronary artery disease)     Carotid disease, bilateral     Chronic kidney failure, stage 3 (moderate)     Cough     Diabetes mellitus     Fatigue     HLD (hyperlipidemia)     Hypercholesteremia     Lightheadedness     Loss of appetite     On home oxygen therapy     uses at night    Skin cancer of face     SOB (shortness of breath)     Stroke     Syncope     Weakness     Weight loss, unintentional      No past surgical history pertinent negatives on file.  Scheduled Meds:  Continuous Infusions:  PRN Meds:    Review of patient's allergies indicates:   Allergen Reactions    Benicar [olmesartan] Other (See Comments)     There are no hospital problems to display for this patient.    Blood pressure (!) 130/51, pulse 60, height 5' 4" (1.626 m), weight 63 kg (139 lb), SpO2 95 %.    Subjective:  The patient returns to clinic doing.  He has not been have passing out anymore however they have a lot of new complaints with leg drop inability to swallow difficulty walking upper back pain neck pain      Objective:  His wounds have healed well.      Assessment & Plan:  I will discuss his case with Dr. Hinson.  He will see him today.      Jacquie Maza MD  3/6/2024    "

## 2024-05-01 DIAGNOSIS — R13.10 PROBLEMS WITH SWALLOWING AND MASTICATION: Primary | ICD-10-CM

## 2024-05-06 ENCOUNTER — TELEPHONE (OUTPATIENT)
Dept: CARDIAC SURGERY | Facility: CLINIC | Age: 89
End: 2024-05-06
Payer: MEDICARE

## 2024-05-06 NOTE — TELEPHONE ENCOUNTER
Inst that Dr. Maza will be in clinic on Wednesday and will discuss with him and call back.  Verb understanding.   ----- Message from Haseeb Freeman sent at 5/6/2024  3:07 PM CDT -----  Regarding: questions  Contact: pts daughter  Pts daughter Luisana#[phone] was waiting on a call back from last week.  Needs to schedule pts other carotid sx.  Re:Does he need to have any testing first?

## 2024-05-08 ENCOUNTER — HOSPITAL ENCOUNTER (OUTPATIENT)
Dept: RADIOLOGY | Facility: HOSPITAL | Age: 89
Discharge: HOME | End: 2024-05-08
Attending: STUDENT IN AN ORGANIZED HEALTH CARE EDUCATION/TRAINING PROGRAM
Payer: MEDICARE

## 2024-05-08 DIAGNOSIS — R13.10 PROBLEMS WITH SWALLOWING AND MASTICATION: ICD-10-CM

## 2024-05-08 PROCEDURE — 25500020 PHARM REV CODE 255: Performed by: STUDENT IN AN ORGANIZED HEALTH CARE EDUCATION/TRAINING PROGRAM

## 2024-05-08 PROCEDURE — 74220 X-RAY XM ESOPHAGUS 1CNTRST: CPT | Mod: TC

## 2024-05-08 PROCEDURE — A9698 NON-RAD CONTRAST MATERIALNOC: HCPCS | Performed by: STUDENT IN AN ORGANIZED HEALTH CARE EDUCATION/TRAINING PROGRAM

## 2024-05-08 RX ADMIN — BARIUM SULFATE 75 ML: 0.6 SUSPENSION ORAL at 08:05

## 2024-05-08 RX ADMIN — BARIUM SULFATE 75 ML: 980 POWDER, FOR SUSPENSION ORAL at 08:05

## 2024-06-04 ENCOUNTER — DOCUMENT SCAN (OUTPATIENT)
Dept: HOME HEALTH SERVICES | Facility: HOSPITAL | Age: 89
End: 2024-06-04
Payer: MEDICARE

## 2024-06-04 ENCOUNTER — TELEPHONE (OUTPATIENT)
Dept: CARDIAC SURGERY | Facility: CLINIC | Age: 89
End: 2024-06-04

## 2024-06-13 DIAGNOSIS — I65.23 OCCLUSION AND STENOSIS OF BILATERAL CAROTID ARTERIES: Primary | ICD-10-CM

## 2024-06-28 ENCOUNTER — EXTERNAL HOME HEALTH (OUTPATIENT)
Dept: HOME HEALTH SERVICES | Facility: HOSPITAL | Age: 89
End: 2024-06-28
Payer: MEDICARE

## 2024-07-15 ENCOUNTER — TELEPHONE (OUTPATIENT)
Dept: CARDIAC SURGERY | Facility: CLINIC | Age: 89
End: 2024-07-15
Payer: MEDICARE

## 2024-07-15 ENCOUNTER — ANESTHESIA EVENT (OUTPATIENT)
Dept: SURGERY | Facility: HOSPITAL | Age: 89
End: 2024-07-15
Payer: MEDICARE

## 2024-07-15 ENCOUNTER — HOSPITAL ENCOUNTER (OUTPATIENT)
Dept: RADIOLOGY | Facility: HOSPITAL | Age: 89
Discharge: HOME | End: 2024-07-15
Attending: THORACIC SURGERY (CARDIOTHORACIC VASCULAR SURGERY)
Payer: MEDICARE

## 2024-07-15 DIAGNOSIS — I65.23 OCCLUSION AND STENOSIS OF BILATERAL CAROTID ARTERIES: ICD-10-CM

## 2024-07-15 PROCEDURE — 71046 X-RAY EXAM CHEST 2 VIEWS: CPT | Mod: TC

## 2024-07-15 NOTE — TELEPHONE ENCOUNTER
Abnormal EKG notified PA group, MARIA Brumfield said to sent to Cardiologist-Dr. Hinson.  Send via fax.

## 2024-07-15 NOTE — CLINICAL REVIEW
Plavix last dose 7/15/24. labs/EKG/CXR reviewed. cardiology notes utd. Carotid/Angio/Echo noted. chart review complete. ccv

## 2024-07-22 NOTE — PRE-PROCEDURE INSTRUCTIONS
"[facility]: Outpatient Surgery  Preprocedure Check-In Instructions     Your arrival time for your surgery or procedure is _7 am_____.  We ask patients to arrive about 2 hours before surgery to allow for enough time to review your health history & medications, start your IV, complete any outstanding labwork or tests, and meet your Anesthesiologist.    Expectations: "Because of inconsistent procedure completion times, an unexpected wait may occur. The Physicians would like you to be here to prepare in the event they run ahead of time. We will make you as comfortable as possible and keep you informed. We apologize in advance if this happens."    You will arrive at [facility], [address], [place].  Enter through the [place] entrance next to the Emergency Room, and come to the 6th floor to the Outpatient Surgery Department.     Visitory Policy:  You are allowed 2 adult visitors to be with you in the hospital. All hospital visitors should be in good current health.  No small children.     What to Bring:  Please have your ID, insurance cards, and all home medication bottles with you at check in.  Bring your CPAP machine if one is used at home.     Fasting:  Nothing to eat or drink after midnight the night before your procedure. This includes no ice, gum, hard candies, and/or tobacco products.  Follow your doctor's instructions for taking any medications on the morning of your procedure.  If no instructions for taking medications were given, do not take any medications but bring your medications in their bottles to your procedure check in.     Follow your doctor's preoperative instructions regarding skin prep, bowel prep, bathing, or showering prior to your procedure.  If any special soaps were provided to you, please use according to your doctor's instructions. If no instructions were given from your doctor, take a good bath or shower with antibacterial soap the night " before and the morning of your procedure.  On the morning of procedure, wear loose, comfortable clothing.  No lotions, makeup, perfumes, colognes, deodorant, or jewelry to your procedure.  Removable items (glasses, contact lenses, dentures, retainers, hearing aids) need to be removed for your procedure.  Bring your storage containers for these items if you wear them.     Artificial nails, body jewelry, eyelash extensions, and/or hair extensions with metal clips are not allowed during your surgery.  If you currently wear any of these items, please arrange for them to be removed prior to your arrival to the hospital.     Outpatient or Same Day Surgeries:  Any patients receiving sedation/anesthesia are advised not to drive for 24 hours after their procedure.  We do not allow patients to drive themselves home after discharge.  If you are going home after your procedure, please have someone available to drive you home from the hospital.        You may call the Outpatient Surgery Department at [phone] with any questions or concerns.  We are looking forward to meeting you and taking great care of you for your procedure.  Thank you for choosing [facility] for your surgical needs.

## 2024-07-23 ENCOUNTER — HOSPITAL ENCOUNTER (INPATIENT)
Facility: HOSPITAL | Age: 89
LOS: 3 days | Discharge: HOME HEALTH | DRG: 038 | End: 2024-07-26
Attending: THORACIC SURGERY (CARDIOTHORACIC VASCULAR SURGERY) | Admitting: THORACIC SURGERY (CARDIOTHORACIC VASCULAR SURGERY)
Payer: MEDICARE

## 2024-07-23 ENCOUNTER — ANESTHESIA (OUTPATIENT)
Dept: SURGERY | Facility: HOSPITAL | Age: 89
End: 2024-07-23
Payer: MEDICARE

## 2024-07-23 DIAGNOSIS — E87.5 HYPERKALEMIA: ICD-10-CM

## 2024-07-23 DIAGNOSIS — I65.23 OCCLUSION AND STENOSIS OF BILATERAL CAROTID ARTERIES: ICD-10-CM

## 2024-07-23 LAB
POCT GLUCOSE: 103 MG/DL (ref 70–110)
POCT GLUCOSE: 214 MG/DL (ref 70–110)

## 2024-07-23 PROCEDURE — 36620 INSERTION CATHETER ARTERY: CPT | Mod: 59,,,

## 2024-07-23 PROCEDURE — 35301 RECHANNELING OF ARTERY: CPT | Mod: RT,,, | Performed by: THORACIC SURGERY (CARDIOTHORACIC VASCULAR SURGERY)

## 2024-07-23 PROCEDURE — 25000003 PHARM REV CODE 250

## 2024-07-23 PROCEDURE — 25000242 PHARM REV CODE 250 ALT 637 W/ HCPCS: Performed by: ANESTHESIOLOGY

## 2024-07-23 PROCEDURE — 82962 GLUCOSE BLOOD TEST: CPT | Performed by: THORACIC SURGERY (CARDIOTHORACIC VASCULAR SURGERY)

## 2024-07-23 PROCEDURE — 88311 DECALCIFY TISSUE: CPT

## 2024-07-23 PROCEDURE — 36000707: Performed by: THORACIC SURGERY (CARDIOTHORACIC VASCULAR SURGERY)

## 2024-07-23 PROCEDURE — 03UK0KZ SUPPLEMENT RIGHT INTERNAL CAROTID ARTERY WITH NONAUTOLOGOUS TISSUE SUBSTITUTE, OPEN APPROACH: ICD-10-PCS | Performed by: THORACIC SURGERY (CARDIOTHORACIC VASCULAR SURGERY)

## 2024-07-23 PROCEDURE — 37000008 HC ANESTHESIA 1ST 15 MINUTES: Performed by: THORACIC SURGERY (CARDIOTHORACIC VASCULAR SURGERY)

## 2024-07-23 PROCEDURE — 21400001 HC TELEMETRY ROOM

## 2024-07-23 PROCEDURE — 63600175 PHARM REV CODE 636 W HCPCS: Performed by: THORACIC SURGERY (CARDIOTHORACIC VASCULAR SURGERY)

## 2024-07-23 PROCEDURE — 63600175 PHARM REV CODE 636 W HCPCS: Performed by: ANESTHESIOLOGY

## 2024-07-23 PROCEDURE — 27000221 HC OXYGEN, UP TO 24 HOURS

## 2024-07-23 PROCEDURE — 63600175 PHARM REV CODE 636 W HCPCS

## 2024-07-23 PROCEDURE — 63600175 PHARM REV CODE 636 W HCPCS: Mod: JZ,JG

## 2024-07-23 PROCEDURE — 25000003 PHARM REV CODE 250: Performed by: THORACIC SURGERY (CARDIOTHORACIC VASCULAR SURGERY)

## 2024-07-23 PROCEDURE — 36000706: Performed by: THORACIC SURGERY (CARDIOTHORACIC VASCULAR SURGERY)

## 2024-07-23 PROCEDURE — 37000009 HC ANESTHESIA EA ADD 15 MINS: Performed by: THORACIC SURGERY (CARDIOTHORACIC VASCULAR SURGERY)

## 2024-07-23 PROCEDURE — 99024 POSTOP FOLLOW-UP VISIT: CPT | Mod: ,,, | Performed by: PHYSICIAN ASSISTANT

## 2024-07-23 PROCEDURE — 88304 TISSUE EXAM BY PATHOLOGIST: CPT | Performed by: THORACIC SURGERY (CARDIOTHORACIC VASCULAR SURGERY)

## 2024-07-23 PROCEDURE — C1768 GRAFT, VASCULAR: HCPCS | Performed by: THORACIC SURGERY (CARDIOTHORACIC VASCULAR SURGERY)

## 2024-07-23 PROCEDURE — 27201423 OPTIME MED/SURG SUP & DEVICES STERILE SUPPLY: Performed by: THORACIC SURGERY (CARDIOTHORACIC VASCULAR SURGERY)

## 2024-07-23 PROCEDURE — 71000033 HC RECOVERY, INTIAL HOUR: Performed by: THORACIC SURGERY (CARDIOTHORACIC VASCULAR SURGERY)

## 2024-07-23 PROCEDURE — 11000001 HC ACUTE MED/SURG PRIVATE ROOM

## 2024-07-23 PROCEDURE — 36620 INSERTION CATHETER ARTERY: CPT

## 2024-07-23 PROCEDURE — 03CK0ZZ EXTIRPATION OF MATTER FROM RIGHT INTERNAL CAROTID ARTERY, OPEN APPROACH: ICD-10-PCS | Performed by: THORACIC SURGERY (CARDIOTHORACIC VASCULAR SURGERY)

## 2024-07-23 DEVICE — PATCH XENOSURE TAPR .8X8CM: Type: IMPLANTABLE DEVICE | Site: CAROTID | Status: FUNCTIONAL

## 2024-07-23 RX ORDER — PROTAMINE SULFATE 10 MG/ML
INJECTION, SOLUTION INTRAVENOUS
Status: DISCONTINUED | OUTPATIENT
Start: 2024-07-23 | End: 2024-07-23

## 2024-07-23 RX ORDER — ONDANSETRON HYDROCHLORIDE 2 MG/ML
4 INJECTION, SOLUTION INTRAVENOUS DAILY PRN
Status: DISCONTINUED | OUTPATIENT
Start: 2024-07-23 | End: 2024-07-23 | Stop reason: HOSPADM

## 2024-07-23 RX ORDER — ONDANSETRON HYDROCHLORIDE 2 MG/ML
INJECTION, SOLUTION INTRAVENOUS
Status: DISCONTINUED | OUTPATIENT
Start: 2024-07-23 | End: 2024-07-23

## 2024-07-23 RX ORDER — LIDOCAINE HYDROCHLORIDE 20 MG/ML
INJECTION INTRAVENOUS
Status: DISCONTINUED | OUTPATIENT
Start: 2024-07-23 | End: 2024-07-23

## 2024-07-23 RX ORDER — PROPOFOL 10 MG/ML
VIAL (ML) INTRAVENOUS
Status: DISCONTINUED | OUTPATIENT
Start: 2024-07-23 | End: 2024-07-23

## 2024-07-23 RX ORDER — CLOPIDOGREL BISULFATE 75 MG/1
75 TABLET ORAL DAILY
Status: DISCONTINUED | OUTPATIENT
Start: 2024-07-24 | End: 2024-07-26 | Stop reason: HOSPADM

## 2024-07-23 RX ORDER — GLIPIZIDE 5 MG/1
5 TABLET, FILM COATED, EXTENDED RELEASE ORAL
Status: DISCONTINUED | OUTPATIENT
Start: 2024-07-24 | End: 2024-07-26 | Stop reason: HOSPADM

## 2024-07-23 RX ORDER — PHENYLEPHRINE HCL IN 0.9% NACL 1 MG/10 ML
SYRINGE (ML) INTRAVENOUS
Status: DISCONTINUED | OUTPATIENT
Start: 2024-07-23 | End: 2024-07-23

## 2024-07-23 RX ORDER — IPRATROPIUM BROMIDE AND ALBUTEROL SULFATE 2.5; .5 MG/3ML; MG/3ML
SOLUTION RESPIRATORY (INHALATION)
Status: DISPENSED
Start: 2024-07-23 | End: 2024-07-23

## 2024-07-23 RX ORDER — FENTANYL CITRATE 50 UG/ML
INJECTION, SOLUTION INTRAMUSCULAR; INTRAVENOUS
Status: DISCONTINUED | OUTPATIENT
Start: 2024-07-23 | End: 2024-07-23

## 2024-07-23 RX ORDER — HYDROCODONE BITARTRATE AND ACETAMINOPHEN 5; 325 MG/1; MG/1
1 TABLET ORAL EVERY 4 HOURS PRN
Status: DISCONTINUED | OUTPATIENT
Start: 2024-07-23 | End: 2024-07-26 | Stop reason: HOSPADM

## 2024-07-23 RX ORDER — HEPARIN SODIUM 5000 [USP'U]/ML
INJECTION, SOLUTION INTRAVENOUS; SUBCUTANEOUS
Status: DISCONTINUED | OUTPATIENT
Start: 2024-07-23 | End: 2024-07-23 | Stop reason: HOSPADM

## 2024-07-23 RX ORDER — DEXAMETHASONE SODIUM PHOSPHATE 4 MG/ML
INJECTION, SOLUTION INTRA-ARTICULAR; INTRALESIONAL; INTRAMUSCULAR; INTRAVENOUS; SOFT TISSUE
Status: DISCONTINUED | OUTPATIENT
Start: 2024-07-23 | End: 2024-07-23

## 2024-07-23 RX ORDER — GLUCAGON 1 MG
1 KIT INJECTION
Status: DISCONTINUED | OUTPATIENT
Start: 2024-07-23 | End: 2024-07-23 | Stop reason: HOSPADM

## 2024-07-23 RX ORDER — OXYCODONE HYDROCHLORIDE 5 MG/1
5 TABLET ORAL
Status: DISCONTINUED | OUTPATIENT
Start: 2024-07-23 | End: 2024-07-23 | Stop reason: HOSPADM

## 2024-07-23 RX ORDER — ROCURONIUM BROMIDE 10 MG/ML
INJECTION, SOLUTION INTRAVENOUS
Status: DISCONTINUED | OUTPATIENT
Start: 2024-07-23 | End: 2024-07-23

## 2024-07-23 RX ORDER — HEPARIN 100 UNIT/ML
5 SYRINGE INTRAVENOUS
Status: DISCONTINUED | OUTPATIENT
Start: 2024-07-23 | End: 2024-07-23 | Stop reason: HOSPADM

## 2024-07-23 RX ORDER — HEPARIN SODIUM 5000 [USP'U]/ML
INJECTION, SOLUTION INTRAVENOUS; SUBCUTANEOUS
Status: DISCONTINUED | OUTPATIENT
Start: 2024-07-23 | End: 2024-07-23

## 2024-07-23 RX ORDER — VASOPRESSIN 20 [USP'U]/ML
INJECTION, SOLUTION INTRAMUSCULAR; SUBCUTANEOUS
Status: DISCONTINUED | OUTPATIENT
Start: 2024-07-23 | End: 2024-07-23

## 2024-07-23 RX ORDER — MUPIROCIN 20 MG/G
OINTMENT TOPICAL 2 TIMES DAILY
Status: DISPENSED | OUTPATIENT
Start: 2024-07-23 | End: 2024-07-25

## 2024-07-23 RX ORDER — IPRATROPIUM BROMIDE AND ALBUTEROL SULFATE 2.5; .5 MG/3ML; MG/3ML
3 SOLUTION RESPIRATORY (INHALATION) ONCE
Status: COMPLETED | OUTPATIENT
Start: 2024-07-23 | End: 2024-07-23

## 2024-07-23 RX ORDER — CEFAZOLIN SODIUM 2 G/50ML
2 SOLUTION INTRAVENOUS
Status: COMPLETED | OUTPATIENT
Start: 2024-07-23 | End: 2024-07-23

## 2024-07-23 RX ORDER — EPHEDRINE SULFATE 50 MG/ML
INJECTION, SOLUTION INTRAVENOUS
Status: DISCONTINUED | OUTPATIENT
Start: 2024-07-23 | End: 2024-07-23

## 2024-07-23 RX ORDER — HYDROMORPHONE HYDROCHLORIDE 2 MG/ML
0.5 INJECTION, SOLUTION INTRAMUSCULAR; INTRAVENOUS; SUBCUTANEOUS EVERY 5 MIN PRN
Status: DISCONTINUED | OUTPATIENT
Start: 2024-07-23 | End: 2024-07-23 | Stop reason: HOSPADM

## 2024-07-23 RX ORDER — ONDANSETRON HYDROCHLORIDE 2 MG/ML
4 INJECTION, SOLUTION INTRAVENOUS EVERY 6 HOURS PRN
Status: DISCONTINUED | OUTPATIENT
Start: 2024-07-23 | End: 2024-07-26 | Stop reason: HOSPADM

## 2024-07-23 RX ORDER — NAPROXEN SODIUM 220 MG/1
81 TABLET, FILM COATED ORAL DAILY
Status: DISCONTINUED | OUTPATIENT
Start: 2024-07-24 | End: 2024-07-26 | Stop reason: HOSPADM

## 2024-07-23 RX ORDER — DOCUSATE SODIUM 100 MG/1
100 CAPSULE, LIQUID FILLED ORAL DAILY
Status: DISCONTINUED | OUTPATIENT
Start: 2024-07-23 | End: 2024-07-26 | Stop reason: HOSPADM

## 2024-07-23 RX ORDER — DIPHENHYDRAMINE HYDROCHLORIDE 50 MG/ML
25 INJECTION INTRAMUSCULAR; INTRAVENOUS EVERY 6 HOURS PRN
Status: DISCONTINUED | OUTPATIENT
Start: 2024-07-23 | End: 2024-07-23 | Stop reason: HOSPADM

## 2024-07-23 RX ADMIN — ONDANSETRON 4 MG: 2 INJECTION INTRAMUSCULAR; INTRAVENOUS at 04:07

## 2024-07-23 RX ADMIN — Medication 100 MCG: at 07:07

## 2024-07-23 RX ADMIN — LIDOCAINE HYDROCHLORIDE 80 MG: 20 INJECTION INTRAVENOUS at 07:07

## 2024-07-23 RX ADMIN — CEFAZOLIN SODIUM 2 G: 2 SOLUTION INTRAVENOUS at 04:07

## 2024-07-23 RX ADMIN — VASOPRESSIN 0.5 UNITS: 20 INJECTION INTRAVENOUS at 08:07

## 2024-07-23 RX ADMIN — SUGAMMADEX 150 MG: 100 INJECTION, SOLUTION INTRAVENOUS at 08:07

## 2024-07-23 RX ADMIN — FENTANYL CITRATE 50 MCG: 50 INJECTION, SOLUTION INTRAMUSCULAR; INTRAVENOUS at 07:07

## 2024-07-23 RX ADMIN — PROTAMINE SULFATE 100 MG: 10 INJECTION, SOLUTION INTRAVENOUS at 08:07

## 2024-07-23 RX ADMIN — PHENYLEPHRINE HYDROCHLORIDE 30 MCG/MIN: 10 INJECTION INTRAVENOUS at 07:07

## 2024-07-23 RX ADMIN — CEFAZOLIN SODIUM 2 G: 2 SOLUTION INTRAVENOUS at 11:07

## 2024-07-23 RX ADMIN — ROCURONIUM BROMIDE 50 MG: 10 SOLUTION INTRAVENOUS at 07:07

## 2024-07-23 RX ADMIN — HEPARIN SODIUM 1000 UNITS: 5000 INJECTION, SOLUTION INTRAVENOUS; SUBCUTANEOUS at 07:07

## 2024-07-23 RX ADMIN — PROPOFOL 70 MG: 10 INJECTION, EMULSION INTRAVENOUS at 07:07

## 2024-07-23 RX ADMIN — SUGAMMADEX 50 MG: 100 INJECTION, SOLUTION INTRAVENOUS at 08:07

## 2024-07-23 RX ADMIN — EPHEDRINE SULFATE 10 MG: 50 INJECTION INTRAVENOUS at 08:07

## 2024-07-23 RX ADMIN — MUPIROCIN: 20 OINTMENT TOPICAL at 09:07

## 2024-07-23 RX ADMIN — DEXTROSE MONOHYDRATE 1.5 G: 5 INJECTION INTRAVENOUS at 07:07

## 2024-07-23 RX ADMIN — DOCUSATE SODIUM 100 MG: 100 CAPSULE, LIQUID FILLED ORAL at 05:07

## 2024-07-23 RX ADMIN — HYDROCODONE BITARTRATE AND ACETAMINOPHEN 1 TABLET: 5; 325 TABLET ORAL at 07:07

## 2024-07-23 RX ADMIN — Medication 100 MCG: at 08:07

## 2024-07-23 RX ADMIN — ROCURONIUM BROMIDE 30 MG: 10 SOLUTION INTRAVENOUS at 07:07

## 2024-07-23 RX ADMIN — ONDANSETRON 4 MG: 2 INJECTION INTRAMUSCULAR; INTRAVENOUS at 10:07

## 2024-07-23 RX ADMIN — ONDANSETRON 4 MG: 2 INJECTION INTRAMUSCULAR; INTRAVENOUS at 08:07

## 2024-07-23 RX ADMIN — SACUBITRIL AND VALSARTAN 1 TABLET: 49; 51 TABLET, FILM COATED ORAL at 09:07

## 2024-07-23 RX ADMIN — DEXAMETHASONE SODIUM PHOSPHATE 4 MG: 4 INJECTION, SOLUTION INTRA-ARTICULAR; INTRALESIONAL; INTRAMUSCULAR; INTRAVENOUS; SOFT TISSUE at 07:07

## 2024-07-23 RX ADMIN — HYDROMORPHONE HYDROCHLORIDE 0.5 MG: 2 INJECTION INTRAMUSCULAR; INTRAVENOUS; SUBCUTANEOUS at 09:07

## 2024-07-23 RX ADMIN — SODIUM CHLORIDE, SODIUM GLUCONATE, SODIUM ACETATE, POTASSIUM CHLORIDE AND MAGNESIUM CHLORIDE: 526; 502; 368; 37; 30 INJECTION, SOLUTION INTRAVENOUS at 06:07

## 2024-07-23 RX ADMIN — IPRATROPIUM BROMIDE AND ALBUTEROL SULFATE 3 ML: .5; 3 SOLUTION RESPIRATORY (INHALATION) at 09:07

## 2024-07-23 NOTE — NURSING
Nurses Note -- 4 Eyes      7/23/2024   5:40 PM      Skin assessed during: Admit      [x] No Altered Skin Integrity Present    []Prevention Measures Documented      [] Yes- Altered Skin Integrity Present or Discovered   [] LDA Added if Not in Epic (Describe Wound)   [] New Altered Skin Integrity was Present on Admit and Documented in LDA   [] Wound Image Taken    Wound Care Consulted? No    Attending Nurse:  Monika Garcia LPN    Second RN/Staff Member:   Julisa Almodovar RN

## 2024-07-23 NOTE — TRANSFER OF CARE
"Anesthesia Transfer of Care Note    Patient: Sandra Rees    Procedure(s) Performed: Procedure(s) (LRB):  ENDARTERECTOMY-CAROTID (Right)    Patient location: PACU    Anesthesia Type: general    Transport from OR: Transported from OR on 2-3 L/min O2 by NC with adequate spontaneous ventilation    Post pain: adequate analgesia    Post assessment: no apparent anesthetic complications    Post vital signs: stable    Level of consciousness: responds to stimulation    Nausea/Vomiting: no nausea/vomiting    Complications: none    Transfer of care protocol was followed      Last vitals: Visit Vitals  /61 (BP Location: Right arm, Patient Position: Lying)   Pulse 76   Temp 36.4 °C (97.6 °F) (Oral)   Resp 16   Ht 5' 4" (1.626 m)   Wt 65.9 kg (145 lb 3.2 oz)   SpO2 96%   BMI 24.92 kg/m²     "

## 2024-07-23 NOTE — ANESTHESIA PROCEDURE NOTES
Intubation    Date/Time: 7/23/2024 7:20 AM    Performed by: Alethea Davis CRNA  Authorized by: Frankie Bolanos MD    Intubation:     Induction:  Intravenous    Intubated:  Postinduction    Mask Ventilation:  Easy mask    Attempts:  1    Attempted By:  CRNA    Method of Intubation:  Direct    Blade:  Dockery 2    Laryngeal View Grade: Grade I - full view of cords      Difficult Airway Encountered?: No      Complications:  None    Airway Device:  Oral endotracheal tube    Airway Device Size:  7.5    Style/Cuff Inflation:  Cuffed    Inflation Amount (mL):  8    Tube secured:  22    Secured at:  The lips    Placement Verified By:  Capnometry    Complicating Factors:  None    Findings Post-Intubation:  BS equal bilateral and atraumatic/condition of teeth unchanged

## 2024-07-23 NOTE — ANESTHESIA POSTPROCEDURE EVALUATION
Anesthesia Post Evaluation    Patient: Sandra Rees    Procedure(s) Performed: Procedure(s) (LRB):  ENDARTERECTOMY-CAROTID (Right)    Final Anesthesia Type: general      Patient location during evaluation: PACU  Patient participation: Yes- Able to Participate  Level of consciousness: awake  Post-procedure vital signs: reviewed and stable  Pain management: adequate  Airway patency: patent  JAIR mitigation strategies: Multimodal analgesia  PONV status at discharge: No PONV  Anesthetic complications: no      Cardiovascular status: blood pressure returned to baseline  Respiratory status: spontaneous ventilation  Hydration status: euvolemic  Follow-up not needed.              Vitals Value Taken Time   /75 07/23/24 1002   Temp 96.7 07/23/24 1008   Pulse 89 07/23/24 1008   Resp 18 07/23/24 1008   SpO2 91 % 07/23/24 1008   Vitals shown include unfiled device data.      No case tracking events are documented in the log.      Pain/Mily Score: Pain Rating Prior to Med Admin: 7 (7/23/2024  9:34 AM)  Mily Score: 8 (7/23/2024  9:52 AM)

## 2024-07-23 NOTE — ANESTHESIA PREPROCEDURE EVALUATION
07/23/2024  Sandra Rees is a 89 y.o., male.    Patient with known Carotid stenosis for surgical repair. Other side done earlier this year, and plan has been to correct both sides - no symptoms related to this side.    Past Medical History:   Diagnosis Date    Bleeding ulcer     Bradycardia     CAD (coronary artery disease)     Carotid disease, bilateral     Chronic kidney failure, stage 3 (moderate)     Cough     Diabetes mellitus     Fatigue     HLD (hyperlipidemia)     Hypercholesteremia     Lightheadedness     Loss of appetite     PAD (peripheral artery disease)     Skin cancer of face     SOB (shortness of breath)     Stroke     Syncope     Walker as ambulation aid     Weakness     Weight loss, unintentional      Past Surgical History:   Procedure Laterality Date    CAROTID ENDARTERECTOMY Left 09/08/2023    Procedure: ENDARTERECTOMY-CAROTID;  Surgeon: Jacquie Maza MD;  Location: [facility];  Service: Cardiology;  Laterality: Left;  REQ TF - 900    CATARACT EXTRACTION Bilateral     ESOPHAGOGASTRODUODENOSCOPY      INSERTION OF PACEMAKER N/A 2/14/2024    Procedure: INSERTION, PACEMAKER;  Surgeon: Jacquie Maza MD;  Location: [facility];  Service: Cardiology;  Laterality: N/A;  Medtronic Rep-Dual Chamber Pacemaker    SKIN CANCER EXCISION      multiple     No current facility-administered medications on file prior to encounter.     Current Outpatient Medications on File Prior to Encounter   Medication Sig Dispense Refill    clopidogreL (PLAVIX) 75 mg tablet Take 75 mg by mouth once daily.      docusate sodium (COLACE) 100 MG capsule Take 100 mg by mouth Daily.      ENTRESTO 49-51 mg per tablet Take 1 tablet by mouth 2 (two) times daily.      furosemide (LASIX) 20 MG tablet Take 20 mg by mouth as needed.      glipiZIDE 5 MG TR24 Take 5 mg by mouth daily with breakfast.      simvastatin (ZOCOR) 40 MG tablet  Take 40 mg by mouth every evening.      cyproheptadine (PERIACTIN) 4 mg tablet Take 4 mg by mouth 2 (two) times daily.      ondansetron (ZOFRAN-ODT) 4 MG TbDL Take 1 tablet (4 mg total) by mouth every 12 (twelve) hours as needed (n/v). 20 tablet 0    ONETOUCH ULTRA TEST Strp as needed.       Had TTE done earlier this year - good LV Ftn, no prohibitive valvular abnormalities    Pre-op Assessment    I have reviewed the Patient Summary Reports.     I have reviewed the Nursing Notes. I have reviewed the NPO Status.   I have reviewed the Medications.     Review of Systems  Social:  Non-Smoker       Hematology/Oncology:       -- Anemia (Hgb 11.6):                                  Cardiovascular:  Exercise tolerance: poor   Hypertension   CAD          PVD hyperlipidemia                             Pulmonary:      Shortness of breath                  Renal/:  Chronic Renal Disease, CKD                Hepatic/GI:   PUD,               Neurological:   CVA                                    Endocrine:  Diabetes               Physical Exam  General: Cooperative, Alert and Oriented    Airway:  Mallampati: II   Mouth Opening: Small, but > 3cm  TM Distance: > 6 cm  Tongue: Normal  Neck ROM: Extension Decreased    Dental:  Edentulous        Anesthesia Plan  Type of Anesthesia, risks & benefits discussed:    Anesthesia Type: Gen ETT  Intra-op Monitoring Plan: Standard ASA Monitors and Art Line  Post Op Pain Control Plan: multimodal analgesia  Induction:  IV  Airway Plan: Direct, Post-Induction  Informed Consent: Informed consent signed with the Patient and all parties understand the risks and agree with anesthesia plan.  All questions answered. Patient consented to blood products? Yes  ASA Score: 3  Day of Surgery Review of History & Physical: H&P Update referred to the surgeon/provider.I have interviewed and examined the patient. I have reviewed the patient's H&P dated: There are no significant changes.     Ready For Surgery From  Anesthesia Perspective.     .

## 2024-07-23 NOTE — H&P
CT SURGERY PROGRESS NOTE  Sandra Rees  89 y.o.  9/11/1930    Patients Procedure: Procedure(s) (LRB):  ENDARTERECTOMY-CAROTID (Right)    HPI RTC for scheduling R CEA   Pt of Dr Mohit moreira- originally sent for PPM and SARAHY, s/p L CEA   Recovereed well from PPM Dr Maza     Plan R CEA 7/23/24          Past Medical History:   Diagnosis Date    Bleeding ulcer      CAD (coronary artery disease)      Carotid disease, bilateral      Chronic kidney failure, stage 3 (moderate)      Cough      Diabetes mellitus      HLD (hyperlipidemia)      Hypercholesteremia      Lightheadedness      Skin cancer of face      SOB (shortness of breath)              Past Surgical History:   Procedure Laterality Date    CAROTID ENDARTERECTOMY Left 9/8/2023     Procedure: ENDARTERECTOMY-CAROTID;  Surgeon: Jacquie Maza MD;  Location: [facility];  Service: Cardiology;  Laterality: Left;  REQ TF - 900    ESOPHAGOGASTRODUODENOSCOPY        SKIN CANCER EXCISION         multiple      Subjective  Interval History:   Review of Systems   Constitutional: Negative.    HENT: Negative.     Eyes: Negative.    Respiratory: Negative.     Cardiovascular:         HPI   Neurological: Negative.    Psychiatric/Behavioral: Negative.         Medication List  Infusions    Scheduled   cefUROXime (ZINACEF) IVPB  1.5 g Intravenous Once Pre-Op       Objective:  Recent Vitals:  Temp:  [97.6 °F (36.4 °C)] 97.6 °F (36.4 °C)  Pulse:  [76-79] 76  Resp:  [16-20] 16  SpO2:  [96 %] 96 %  BP: (104-138)/(60-66) 138/61    Physical Exam  Constitutional:       Appearance: Normal appearance.   HENT:      Head: Normocephalic.   Cardiovascular:      Rate and Rhythm: Normal rate and regular rhythm.      Pulses: Normal pulses.   Pulmonary:      Effort: Pulmonary effort is normal.      Breath sounds: Normal breath sounds.   Abdominal:      General: Abdomen is flat. Bowel sounds are normal.      Palpations: Abdomen is soft.   Musculoskeletal:         General: Normal range of motion.       "Cervical back: Normal range of motion.   Skin:     General: Skin is warm.   Neurological:      General: No focal deficit present.      Mental Status: He is alert and oriented to person, place, and time.          I/O last 24 hrs:  Intake/Output - Last 3 Shifts       None            Labs  BMP: No results for input(s): "GLU", "NA", "K", "CL", "CO2", "BUN", "CREATININE", "CALCIUM", "MG" in the last 48 hours.  CBC: No results for input(s): "WBC", "RBC", "HGB", "HCT", "PLT", "MCV", "MCH", "MCHC" in the last 48 hours.  CMP: No results for input(s): "GLU", "CALCIUM", "ALBUMIN", "PROT", "NA", "K", "CO2", "CL", "BUN", "CREATININE", "ALKPHOS", "ALT", "AST", "BILITOT" in the last 48 hours.  Coagulation: No results for input(s): "PT", "INR", "APTT" in the last 48 hours.      Imaging:   CXR: No results found in the last 24 hours.        ASSESSMENT/PLAN:    R SARAHY - R CEA today Dr Maza   S/p  L CEA   S/p PPM    Case and plan of care discussed with MD Octaviano Hobson, PASONAL   "

## 2024-07-23 NOTE — PLAN OF CARE
07/23/24 1517   Discharge Assessment   Assessment Type Discharge Planning Assessment   Confirmed/corrected address, phone number and insurance Yes   Confirmed Demographics Correct on Facesheet   Source of Information family  (Daughter-In-Law: La Rees: [phone])   If unable to respond/provide information was family/caregiver contacted? Yes   Contact Name/Number Daughter-In-Law: La Rees: [phone]; present at the patient's bedside.   Communicated EFRAIN with patient/caregiver Date not available/Unable to determine   Reason For Admission (7/23/2024): Procedure(s) Performed: Procedure(s) (LRB):  ENDARTERECTOMY-CAROTID (Right)   People in Home spouse;child(armando), adult  (The patient lives with his wife: Yoanna Rees and his adughter: Luisana Rees ([phone]).)   Facility Arrived From: Private residence.   Do you expect to return to your current living situation? Yes   Do you have help at home or someone to help you manage your care at home? Yes   Who are your caregiver(s) and their phone number(s)? The patient lives with his wife: Yoanna Rees and his adughter: Luisana Rees ([phone]).   Prior to hospitilization cognitive status: Alert/Oriented   Current cognitive status: Alert/Oriented   Walking or Climbing Stairs Difficulty yes   Walking or Climbing Stairs ambulation difficulty, requires equipment   Mobility Management The patient uses a rollator/rolling walker and an electric lift recliner for mobility concerns.   Dressing/Bathing Difficulty yes   Dressing/Bathing bathing difficulty, requires equipment   Dressing/Bathing Management The patient lives with his wife: Yoanna Rees and his adughter: Luisana Rees ([phone]). He uses grab bars and a shower chair for hygiene (Bathing/Showering) needs PRN.   Home Accessibility wheelchair accessible  (The patient's home is built on a slab.)   Home Layout Able to live on 1st floor   Equipment Currently Used at Home grab bar;raised  toilet;rollator;walker, rolling;shower chair;bedside commode;hospital bed   Readmission within 30 days? No   Patient currently being followed by outpatient case management? No   Do you currently have service(s) that help you manage your care at home? Yes   Name and Contact number of agency The patient is active with home health services through: [facility] for home health services. He will resume HH services upon discharge.   Is the pt/caregiver preference to resume services with current agency Yes   Do you take prescription medications? Yes   Do you have prescription coverage? Yes   Coverage Payor: [employer] MEDICARE - [facility]   Do you have any problems affording any of your prescribed medications? No   Is the patient taking medications as prescribed? yes   Who is going to help you get home at discharge? The patient lives with his wife: Yoanna Kunz and his adughter: Luisana Kunz ([phone]).   How do you get to doctors appointments? family or friend will provide   Are you on dialysis? No   Do you take coumadin? No   Discharge Plan A Home Health  (FOC: The patient is active with home health services through: [facility] for home health services. He will resume HH services upon discharge.)   Discharge Plan B Home Health   DME Needed Upon Discharge  none   Discharge Plan discussed with: Patient;Caregiver   Name(s) and Number(s) Daughter-In-Law: La kunz: [phone]   Transition of Care Barriers None   Physical Activity   On average, how many days per week do you engage in moderate to strenuous exercise (like a brisk walk)? 3 days   On average, how many minutes do you engage in exercise at this level? 30 min   Financial Resource Strain   How hard is it for you to pay for the very basics like food, housing, medical care, and heating? Not hard   Housing Stability   In the last 12 months, was there a time when you were not able to pay the mortgage or rent  on time? N   At any time in the past 12 months, were you homeless or living in a shelter (including now)? N   Transportation Needs   Has the lack of transportation kept you from medical appointments, meetings, work or from getting things needed for daily living? No   Food Insecurity   Within the past 12 months, you worried that your food would run out before you got the money to buy more. Never true   Within the past 12 months, the food you bought just didn't last and you didn't have money to get more. Never true   Stress   Do you feel stress - tense, restless, nervous, or anxious, or unable to sleep at night because your mind is troubled all the time - these days? Only a littl   Social Isolation   How often do you feel lonely or isolated from those around you?  Rarely   Alcohol Use   Q1: How often do you have a drink containing alcohol? Never   Q2: How many drinks containing alcohol do you have on a typical day when you are drinking? None   Q3: How often do you have six or more drinks on one occasion? Never   Utilities   In the past 12 months has the electric, gas, oil, or water company threatened to shut off services in your home? No   Health Literacy   How often do you need to have someone help you when you read instructions, pamphlets, or other written material from your doctor or pharmacy? Never   OTHER   Name(s) of People in Home The patient lives with his wife: Yoanna Rees and his daughter: Luisana Rees ([phone]).

## 2024-07-23 NOTE — OP NOTE
[facility]                       [address] [place] [address]    PATIENT NAME:      ESPERANZA KIM   YOB: 1930  CSN:               [id number]  MRN:               [id number]  ADMIT DATE:        07/23/2024 05:07:00  PHYSICIAN:         Jacquie Maza MD                          OPERATIVE REPORT      DATE OF SURGERY:    07/23/2024 00:00:00    SURGEON:  Jacquie Maza MD    PREOPERATIVE DIAGNOSIS:  Severe right carotid stenosis.    PROCEDURE:  Right carotid endarterectomy with pericardial patch angioplasty.    POSTOPERATIVE DIAGNOSIS:  Severe right carotid stenosis.    ASSISTANT:  Dr. Cedric Amador, R4.    BLOOD LOSS:  Minimal.    TECHNIQUE:  Under informed consent, the patient was taken to the OR, put in   supine position, general anesthesia was induced, and therefore, maintained for   remainder of procedure.  All pressure points were padded adequately.  The skin   over the right neck and chest was prepped and draped in usual sterile fashion.    IV antibiotics administered.  Small incision was made on anterior   sternocleidomastoid muscle followed by taking down subcutaneous tissue and   platysma exposing the carotid sheaths, common, external, and internal carotids.    The patient was heparinized.  The internal carotid had calcification all the   way up.  I dissected it for a significant amount.  Clamps were applied pretty   high on the internal carotid artery.  Arteriotomy was performed.  Endarterectomy   was performed with eversion endarterectomy of the external carotid artery, had   smooth transition plane distally.  The defect was cleaned very well and   irrigated with heparinized saline.  It was closed over pericardial patch with   de-airing from the internal prior to tying the knots.  The flow was then   reestablished in direction of the external carotid.  The internal carotid clamp   was then removed.  There  was good hemostasis.  Heparin was reversed with   protamine.  The wounds were irrigated and closed in layers of absorbable   sutures.        ______________________________  MD OLAMIDE Orozco/TREV  DD:  07/23/2024  Time:  09:56AM  DT:  07/23/2024  Time:  10:29AM  Job #:  [id number]      OPERATIVE REPORT

## 2024-07-23 NOTE — ANESTHESIA PROCEDURE NOTES
Arterial    Diagnosis: CAD    Patient location during procedure: pre-op    Staffing  Authorizing Provider: Frankie Bolanos MD  Performing Provider: Alethea Davis CRNA    Staffing  Performed by: Alethea Davis CRNA  Authorized by: Frankie Bolanos MD    Anesthesiologist was present at the time of the procedure.    Preanesthetic Checklist  Completed: patient identified, IV checked, site marked, risks and benefits discussed, surgical consent, monitors and equipment checked, pre-op evaluation, timeout performed and anesthesia consent givenArterial  Skin Prep: chlorhexidine gluconate  Local Infiltration: none  Orientation: left  Location: radial    Catheter Size: 20 G  Catheter placement by Anatomical landmarks. Heme positive aspiration all ports. Insertion Attempts: 1  Assessment  Dressing: secured with tape and tegaderm  Patient: Tolerated well

## 2024-07-24 LAB
ABS NEUT (OLG): 16.21 X10(3)/MCL (ref 2.1–9.2)
ANION GAP SERPL CALC-SCNC: 7 MEQ/L
BUN SERPL-MCNC: 51 MG/DL (ref 8.4–25.7)
CALCIUM SERPL-MCNC: 8.2 MG/DL (ref 8.8–10)
CHLORIDE SERPL-SCNC: 105 MMOL/L (ref 98–111)
CO2 SERPL-SCNC: 25 MMOL/L (ref 23–31)
CREAT SERPL-MCNC: 2.96 MG/DL (ref 0.73–1.18)
CREAT/UREA NIT SERPL: 17
ERYTHROCYTE [DISTWIDTH] IN BLOOD BY AUTOMATED COUNT: 13 % (ref 11.5–17)
GFR SERPLBLD CREATININE-BSD FMLA CKD-EPI: 19 ML/MIN/1.73/M2
GLUCOSE SERPL-MCNC: 230 MG/DL (ref 75–121)
HCT VFR BLD AUTO: 36.3 % (ref 42–52)
HGB BLD-MCNC: 11.7 G/DL (ref 14–18)
INSTRUMENT WBC (OLG): 19.3 X10(3)/MCL
LYMPHOCYTES NFR BLD MANUAL: 0.96 X10(3)/MCL
LYMPHOCYTES NFR BLD MANUAL: 5 %
MAGNESIUM SERPL-MCNC: 2.2 MG/DL (ref 1.6–2.6)
MCH RBC QN AUTO: 33.3 PG (ref 27–31)
MCHC RBC AUTO-ENTMCNC: 32.2 G/DL (ref 33–36)
MCV RBC AUTO: 103.4 FL (ref 80–94)
MONOCYTES NFR BLD MANUAL: 12 %
MONOCYTES NFR BLD MANUAL: 2.32 X10(3)/MCL (ref 0.1–1.3)
NEUTROPHILS NFR BLD MANUAL: 84 %
NRBC BLD AUTO-RTO: 0 %
OHS QRS DURATION: 194 MS
OHS QTC CALCULATION: 518 MS
PHOSPHATE SERPL-MCNC: 3 MG/DL (ref 2.3–4.7)
PLATELET # BLD AUTO: 176 X10(3)/MCL (ref 130–400)
PLATELET # BLD EST: NORMAL 10*3/UL
PMV BLD AUTO: 11.4 FL (ref 7.4–10.4)
POCT GLUCOSE: 142 MG/DL (ref 70–110)
POCT GLUCOSE: 145 MG/DL (ref 70–110)
POCT GLUCOSE: 180 MG/DL (ref 70–110)
POCT GLUCOSE: 188 MG/DL (ref 70–110)
POCT GLUCOSE: 199 MG/DL (ref 70–110)
POCT GLUCOSE: 203 MG/DL (ref 70–110)
POCT GLUCOSE: 266 MG/DL (ref 70–110)
POCT GLUCOSE: 353 MG/DL (ref 70–110)
POTASSIUM SERPL-SCNC: 5.2 MMOL/L (ref 3.5–5.1)
POTASSIUM SERPL-SCNC: 6.3 MMOL/L (ref 3.5–5.1)
RBC # BLD AUTO: 3.51 X10(6)/MCL (ref 4.7–6.1)
RBC MORPH BLD: NORMAL
SODIUM SERPL-SCNC: 137 MMOL/L (ref 136–145)
WBC # BLD AUTO: 19.29 X10(3)/MCL (ref 4.5–11.5)

## 2024-07-24 PROCEDURE — 94760 N-INVAS EAR/PLS OXIMETRY 1: CPT

## 2024-07-24 PROCEDURE — 36415 COLL VENOUS BLD VENIPUNCTURE: CPT | Performed by: STUDENT IN AN ORGANIZED HEALTH CARE EDUCATION/TRAINING PROGRAM

## 2024-07-24 PROCEDURE — 21400001 HC TELEMETRY ROOM

## 2024-07-24 PROCEDURE — 84132 ASSAY OF SERUM POTASSIUM: CPT | Performed by: STUDENT IN AN ORGANIZED HEALTH CARE EDUCATION/TRAINING PROGRAM

## 2024-07-24 PROCEDURE — 80048 BASIC METABOLIC PNL TOTAL CA: CPT | Performed by: STUDENT IN AN ORGANIZED HEALTH CARE EDUCATION/TRAINING PROGRAM

## 2024-07-24 PROCEDURE — 25000003 PHARM REV CODE 250

## 2024-07-24 PROCEDURE — 84100 ASSAY OF PHOSPHORUS: CPT | Performed by: STUDENT IN AN ORGANIZED HEALTH CARE EDUCATION/TRAINING PROGRAM

## 2024-07-24 PROCEDURE — 93010 ELECTROCARDIOGRAM REPORT: CPT | Mod: ,,, | Performed by: INTERNAL MEDICINE

## 2024-07-24 PROCEDURE — 25000003 PHARM REV CODE 250: Mod: JZ,JG | Performed by: STUDENT IN AN ORGANIZED HEALTH CARE EDUCATION/TRAINING PROGRAM

## 2024-07-24 PROCEDURE — 85025 COMPLETE CBC W/AUTO DIFF WBC: CPT | Performed by: STUDENT IN AN ORGANIZED HEALTH CARE EDUCATION/TRAINING PROGRAM

## 2024-07-24 PROCEDURE — 93005 ELECTROCARDIOGRAM TRACING: CPT

## 2024-07-24 PROCEDURE — 25000003 PHARM REV CODE 250: Performed by: PHYSICIAN ASSISTANT

## 2024-07-24 PROCEDURE — 83735 ASSAY OF MAGNESIUM: CPT | Performed by: STUDENT IN AN ORGANIZED HEALTH CARE EDUCATION/TRAINING PROGRAM

## 2024-07-24 PROCEDURE — 27000221 HC OXYGEN, UP TO 24 HOURS

## 2024-07-24 PROCEDURE — 63600175 PHARM REV CODE 636 W HCPCS: Performed by: STUDENT IN AN ORGANIZED HEALTH CARE EDUCATION/TRAINING PROGRAM

## 2024-07-24 RX ORDER — ACETAMINOPHEN 325 MG/1
650 TABLET ORAL EVERY 6 HOURS PRN
Status: DISCONTINUED | OUTPATIENT
Start: 2024-07-24 | End: 2024-07-26 | Stop reason: HOSPADM

## 2024-07-24 RX ORDER — CALCIUM GLUCONATE 20 MG/ML
1 INJECTION, SOLUTION INTRAVENOUS ONCE
Status: COMPLETED | OUTPATIENT
Start: 2024-07-24 | End: 2024-07-24

## 2024-07-24 RX ORDER — SODIUM CHLORIDE 9 MG/ML
INJECTION, SOLUTION INTRAVENOUS CONTINUOUS
Status: DISCONTINUED | OUTPATIENT
Start: 2024-07-24 | End: 2024-07-25

## 2024-07-24 RX ORDER — CALCIUM GLUCONATE 20 MG/ML
1 INJECTION, SOLUTION INTRAVENOUS EVERY 10 MIN PRN
Status: DISCONTINUED | OUTPATIENT
Start: 2024-07-24 | End: 2024-07-26 | Stop reason: HOSPADM

## 2024-07-24 RX ADMIN — GLIPIZIDE 5 MG: 5 TABLET, FILM COATED, EXTENDED RELEASE ORAL at 10:07

## 2024-07-24 RX ADMIN — CLOPIDOGREL BISULFATE 75 MG: 75 TABLET ORAL at 10:07

## 2024-07-24 RX ADMIN — HUMAN INSULIN 6.59 UNITS: 100 INJECTION, SOLUTION SUBCUTANEOUS at 11:07

## 2024-07-24 RX ADMIN — CALCIUM GLUCONATE 1 G: 20 INJECTION, SOLUTION INTRAVENOUS at 10:07

## 2024-07-24 RX ADMIN — DEXTROSE MONOHYDRATE 500 ML: 100 INJECTION, SOLUTION INTRAVENOUS at 11:07

## 2024-07-24 RX ADMIN — ACETAMINOPHEN 650 MG: 325 TABLET, FILM COATED ORAL at 09:07

## 2024-07-24 RX ADMIN — ASPIRIN 81 MG CHEWABLE TABLET 81 MG: 81 TABLET CHEWABLE at 10:07

## 2024-07-24 RX ADMIN — MUPIROCIN: 20 OINTMENT TOPICAL at 09:07

## 2024-07-24 RX ADMIN — SACUBITRIL AND VALSARTAN 1 TABLET: 49; 51 TABLET, FILM COATED ORAL at 09:07

## 2024-07-24 RX ADMIN — SODIUM CHLORIDE: 9 INJECTION, SOLUTION INTRAVENOUS at 12:07

## 2024-07-24 RX ADMIN — SODIUM CHLORIDE: 9 INJECTION, SOLUTION INTRAVENOUS at 11:07

## 2024-07-24 NOTE — NURSING
Nurses Note -- 4 Eyes      7/24/2024   4:15 PM      Skin assessed during: Q Shift Change      [] No Altered Skin Integrity Present    []Prevention Measures Documented      [x] Yes- Altered Skin Integrity Present or Discovered   [] LDA Added if Not in Epic (Describe Wound)   [] New Altered Skin Integrity was Present on Admit and Documented in LDA   [] Wound Image Taken    Wound Care Consulted? No    Attending Nurse:  Lisa Tucker RN/Staff Member:   Jaclyn

## 2024-07-24 NOTE — PROGRESS NOTES
[facility]   Rounding Progress Note  Cardiothoracic Surgery    SUBJECTIVE:     Chief Complaint/Reason for Admission: s/p R CEA    NAEON; AF and HDS  Pain is well controlled, does have some weakness in the left corner of the mouth  Tolerating sips  Oriented  Some dizziness when ambulating    No current facility-administered medications on file prior to encounter.     Current Outpatient Medications on File Prior to Encounter   Medication Sig Dispense Refill    clopidogreL (PLAVIX) 75 mg tablet Take 75 mg by mouth once daily.      docusate sodium (COLACE) 100 MG capsule Take 100 mg by mouth Daily.      ENTRESTO 49-51 mg per tablet Take 1 tablet by mouth 2 (two) times daily.      furosemide (LASIX) 20 MG tablet Take 20 mg by mouth as needed.      glipiZIDE 5 MG TR24 Take 5 mg by mouth daily with breakfast.      simvastatin (ZOCOR) 40 MG tablet Take 40 mg by mouth every evening.      cyproheptadine (PERIACTIN) 4 mg tablet Take 4 mg by mouth 2 (two) times daily.      ondansetron (ZOFRAN-ODT) 4 MG TbDL Take 1 tablet (4 mg total) by mouth every 12 (twelve) hours as needed (n/v). 20 tablet 0    ONETOUCH ULTRA TEST Strp as needed.          Review of patient's allergies indicates:   Allergen Reactions    Benicar [olmesartan] Other (See Comments)        Past Medical History:   Diagnosis Date    Bleeding ulcer     Bradycardia     CAD (coronary artery disease)     Carotid disease, bilateral     Chronic kidney failure, stage 3 (moderate)     Cough     Diabetes mellitus     Fatigue     HLD (hyperlipidemia)     Hypercholesteremia     Lightheadedness     Loss of appetite     PAD (peripheral artery disease)     Skin cancer of face     SOB (shortness of breath)     Stroke     Syncope     Walker as ambulation aid     Weakness     Weight loss, unintentional         Past Surgical History:   Procedure Laterality Date    CAROTID ENDARTERECTOMY Left 09/08/2023    Procedure: ENDARTERECTOMY-CAROTID;  Surgeon: Jacquie Maza  "MD;  Location: [facility];  Service: Cardiology;  Laterality: Left;  REQ TF - 900    CATARACT EXTRACTION Bilateral     ESOPHAGOGASTRODUODENOSCOPY      INSERTION OF PACEMAKER N/A 2/14/2024    Procedure: INSERTION, PACEMAKER;  Surgeon: Jacquie Maza MD;  Location: [facility];  Service: Cardiology;  Laterality: N/A;  Medtronic Rep-Dual Chamber Pacemaker    SKIN CANCER EXCISION      multiple        OBJECTIVE:        Physical Exam:  Physical Exam  Constitutional:       Appearance: Normal appearance. He is not ill-appearing.   HENT:      Head: Normocephalic and atraumatic.      Nose: Nose normal.      Mouth/Throat:      Mouth: Mucous membranes are moist.   Eyes:      Conjunctiva/sclera: Conjunctivae normal.   Neck:      Comments: Incision is c/d/I, no significant swelling  Cardiovascular:      Rate and Rhythm: Normal rate and regular rhythm.      Pulses: Normal pulses.   Pulmonary:      Effort: Pulmonary effort is normal. No respiratory distress.   Abdominal:      General: Abdomen is flat. There is no distension.      Palpations: Abdomen is soft.      Tenderness: There is no abdominal tenderness.   Musculoskeletal:         General: No swelling or tenderness. Normal range of motion.      Cervical back: Normal range of motion and neck supple.   Skin:     General: Skin is warm and dry.      Capillary Refill: Capillary refill takes less than 2 seconds.   Neurological:      Mental Status: He is alert and oriented to person, place, and time. Mental status is at baseline.      Cranial Nerves: No cranial nerve deficit.      Sensory: No sensory deficit.      Motor: No weakness.      Comments: Does have asymmetric smile on the left          Laboratory:  I have reviewed all pertinent lab results within the past 24 hours.  CBC: No results for input(s): "WBC", "RBC", "HGB", "HCT", "PLT", "MCV", "MCH", "MCHC" in the last 168 hours.  BMP: No results for input(s): "GLU", "NA", "K", "CL", "CO2", "BUN", "CREATININE", "CALCIUM", "MG" in the " last 168 hours.    Diagnostic Results:  none          ASSESSMENT/PLAN:   89 M s/p R CEA    -doing well this morning, does have asymmetric smile on the left but has no tongue deviation or cranial nerve deficit. Sensorimotor intact bilaterally without weakness  -Cont diet  -possible discharge later today    Cedric Galvan MD, PGY-4  Cardiothoracic Surgery

## 2024-07-24 NOTE — NURSING
Nurses Note -- 4 Eyes      7/24/2024   4:27 AM      Skin assessed during: Q Shift Change      [] No Altered Skin Integrity Present    []Prevention Measures Documented      [x] Yes- Altered Skin Integrity Present or Discovered   [] LDA Added if Not in Epic (Describe Wound)   [] New Altered Skin Integrity was Present on Admit and Documented in LDA   [] Wound Image Taken    Wound Care Consulted? No    Attending Nurse:  Jaclyn Shah-SHELLY    Second RN/Staff Member:   Monika Sage

## 2024-07-25 LAB
ANION GAP SERPL CALC-SCNC: 8 MEQ/L
BUN SERPL-MCNC: 58.1 MG/DL (ref 8.4–25.7)
CALCIUM SERPL-MCNC: 8.5 MG/DL (ref 8.8–10)
CHLORIDE SERPL-SCNC: 106 MMOL/L (ref 98–111)
CO2 SERPL-SCNC: 22 MMOL/L (ref 23–31)
CREAT SERPL-MCNC: 2.79 MG/DL (ref 0.73–1.18)
CREAT UR-MCNC: 137.4 MG/DL (ref 63–166)
CREAT UR-MCNC: 140.3 MG/DL (ref 63–166)
CREAT/UREA NIT SERPL: 21
GFR SERPLBLD CREATININE-BSD FMLA CKD-EPI: 20 ML/MIN/1.73/M2
GLUCOSE SERPL-MCNC: 195 MG/DL (ref 75–121)
POCT GLUCOSE: 210 MG/DL (ref 70–110)
POTASSIUM SERPL-SCNC: 5.9 MMOL/L (ref 3.5–5.1)
PROT UR STRIP-MCNC: 25.6 MG/DL
PSYCHE PATHOLOGY RESULT: NORMAL
SODIUM SERPL-SCNC: 136 MMOL/L (ref 136–145)
SODIUM UR-SCNC: 35 MMOL/L
URINE PROTEIN/CREATININE RATIO (OLG): 0.2

## 2024-07-25 PROCEDURE — 84300 ASSAY OF URINE SODIUM: CPT | Performed by: NURSE PRACTITIONER

## 2024-07-25 PROCEDURE — 27200966 HC CLOSED SUCTION SYSTEM

## 2024-07-25 PROCEDURE — 94760 N-INVAS EAR/PLS OXIMETRY 1: CPT

## 2024-07-25 PROCEDURE — 25000003 PHARM REV CODE 250: Performed by: NURSE PRACTITIONER

## 2024-07-25 PROCEDURE — 25000003 PHARM REV CODE 250: Performed by: THORACIC SURGERY (CARDIOTHORACIC VASCULAR SURGERY)

## 2024-07-25 PROCEDURE — 27000221 HC OXYGEN, UP TO 24 HOURS

## 2024-07-25 PROCEDURE — 94640 AIRWAY INHALATION TREATMENT: CPT

## 2024-07-25 PROCEDURE — 36415 COLL VENOUS BLD VENIPUNCTURE: CPT | Performed by: STUDENT IN AN ORGANIZED HEALTH CARE EDUCATION/TRAINING PROGRAM

## 2024-07-25 PROCEDURE — 25000003 PHARM REV CODE 250: Performed by: PHYSICIAN ASSISTANT

## 2024-07-25 PROCEDURE — 82570 ASSAY OF URINE CREATININE: CPT | Performed by: NURSE PRACTITIONER

## 2024-07-25 PROCEDURE — 63600175 PHARM REV CODE 636 W HCPCS: Performed by: NURSE PRACTITIONER

## 2024-07-25 PROCEDURE — 99900035 HC TECH TIME PER 15 MIN (STAT)

## 2024-07-25 PROCEDURE — 25000003 PHARM REV CODE 250

## 2024-07-25 PROCEDURE — 94761 N-INVAS EAR/PLS OXIMETRY MLT: CPT

## 2024-07-25 PROCEDURE — 80048 BASIC METABOLIC PNL TOTAL CA: CPT | Performed by: STUDENT IN AN ORGANIZED HEALTH CARE EDUCATION/TRAINING PROGRAM

## 2024-07-25 PROCEDURE — 25000242 PHARM REV CODE 250 ALT 637 W/ HCPCS: Performed by: PHYSICIAN ASSISTANT

## 2024-07-25 PROCEDURE — 99024 POSTOP FOLLOW-UP VISIT: CPT | Mod: ,,,

## 2024-07-25 PROCEDURE — 99900031 HC PATIENT EDUCATION (STAT)

## 2024-07-25 PROCEDURE — 99900026 HC AIRWAY MAINTENANCE (STAT)

## 2024-07-25 PROCEDURE — 84156 ASSAY OF PROTEIN URINE: CPT | Performed by: NURSE PRACTITIONER

## 2024-07-25 PROCEDURE — 21400001 HC TELEMETRY ROOM

## 2024-07-25 RX ORDER — BISMUTH SUBSALICYLATE 525 MG/30ML
30 LIQUID ORAL ONCE
Status: DISCONTINUED | OUTPATIENT
Start: 2024-07-25 | End: 2024-07-25 | Stop reason: RX

## 2024-07-25 RX ORDER — IPRATROPIUM BROMIDE AND ALBUTEROL SULFATE 2.5; .5 MG/3ML; MG/3ML
3 SOLUTION RESPIRATORY (INHALATION) EVERY 4 HOURS
Status: DISCONTINUED | OUTPATIENT
Start: 2024-07-25 | End: 2024-07-26 | Stop reason: HOSPADM

## 2024-07-25 RX ORDER — BISMUTH SUBSALICYLATE 262 MG/1
2 TABLET ORAL ONCE
Status: COMPLETED | OUTPATIENT
Start: 2024-07-25 | End: 2024-07-25

## 2024-07-25 RX ORDER — FUROSEMIDE 10 MG/ML
40 INJECTION INTRAMUSCULAR; INTRAVENOUS ONCE
Status: COMPLETED | OUTPATIENT
Start: 2024-07-25 | End: 2024-07-25

## 2024-07-25 RX ADMIN — GLIPIZIDE 5 MG: 5 TABLET, FILM COATED, EXTENDED RELEASE ORAL at 10:07

## 2024-07-25 RX ADMIN — FUROSEMIDE 40 MG: 10 INJECTION, SOLUTION INTRAMUSCULAR; INTRAVENOUS at 05:07

## 2024-07-25 RX ADMIN — SODIUM ZIRCONIUM CYCLOSILICATE 10 G: 10 POWDER, FOR SUSPENSION ORAL at 05:07

## 2024-07-25 RX ADMIN — IPRATROPIUM BROMIDE AND ALBUTEROL SULFATE 3 ML: .5; 3 SOLUTION RESPIRATORY (INHALATION) at 12:07

## 2024-07-25 RX ADMIN — SODIUM ZIRCONIUM CYCLOSILICATE 10 G: 10 POWDER, FOR SUSPENSION ORAL at 10:07

## 2024-07-25 RX ADMIN — IPRATROPIUM BROMIDE AND ALBUTEROL SULFATE 3 ML: .5; 3 SOLUTION RESPIRATORY (INHALATION) at 04:07

## 2024-07-25 RX ADMIN — IPRATROPIUM BROMIDE AND ALBUTEROL SULFATE 3 ML: .5; 3 SOLUTION RESPIRATORY (INHALATION) at 11:07

## 2024-07-25 RX ADMIN — ACETAMINOPHEN 650 MG: 325 TABLET, FILM COATED ORAL at 05:07

## 2024-07-25 RX ADMIN — Medication 524 MG: at 10:07

## 2024-07-25 RX ADMIN — Medication: at 12:07

## 2024-07-25 RX ADMIN — CLOPIDOGREL BISULFATE 75 MG: 75 TABLET ORAL at 10:07

## 2024-07-25 RX ADMIN — IPRATROPIUM BROMIDE AND ALBUTEROL SULFATE 3 ML: .5; 3 SOLUTION RESPIRATORY (INHALATION) at 08:07

## 2024-07-25 RX ADMIN — ASPIRIN 81 MG CHEWABLE TABLET 81 MG: 81 TABLET CHEWABLE at 10:07

## 2024-07-25 NOTE — NURSING
Subjective:      Patient ID: Sandra Rees is a 89 y.o. male.    Chief Complaint: No chief complaint on file.    HPI  Review of Systems   Objective:     Physical Exam   Assessment:     1. Occlusion and stenosis of bilateral carotid arteries    2. Hyperkalemia           Wound Incision Right Neck (Active)       Present on Original Admission:    Primary Wound Type: Incision   Side: Right   Orientation:    Location: Neck   Wound Approximate Age at First Assessment (Weeks):    Wound Number:    Is this injury device related?:    Incision Type:    Closure Method: Surgical glue   Wound Description (Comments):    Type:    Additional Comments:    Ankle-Brachial Index:    Pulses:    Removal Indication and Assessment:    Wound Outcome:    Wound Image    07/24/24 2000   Dressing Appearance Dry;Intact;Clean 07/24/24 2000   Drainage Amount None 07/24/24 2000   Drainage Characteristics/Odor No odor 07/24/24 2000   Appearance Intact;Red 07/24/24 2000   Care Applied:;Other (see comments) 07/24/24 2000   Dressing Island/border 07/23/24 2000            Wound 07/24/24 1654 Skin Tear Coccyx (Active)   07/24/24 1654   Present on Original Admission: N   Primary Wound Type: Skin tear   Side:    Orientation:    Location: Coccyx   Wound Approximate Age at First Assessment (Weeks):    Wound Number:    Is this injury device related?:    Incision Type:    Closure Method:    Wound Description (Comments):    Type:    Additional Comments:    Ankle-Brachial Index:    Pulses:    Removal Indication and Assessment:    Wound Outcome:    Wound Image   07/25/24 0900   Dressing Appearance Open to air 07/25/24 0900   Drainage Amount None 07/25/24 0900   Drainage Characteristics/Odor No odor 07/25/24 0900   Appearance Red;Dry 07/25/24 0900   Tissue loss description Partial thickness 07/25/24 0900   Red (%), Wound Tissue Color 100 % 07/25/24 0900   Periwound Area Redness 07/25/24 0900   Wound Length (cm) 2 cm 07/25/24 0900   Wound Width (cm) 0.2 cm 07/25/24  0900   Wound Surface Area (cm^2) 0.4 cm^2 07/25/24 0900   Care Cleansed with:;Antimicrobial agent;Applied: 07/25/24 0900   Dressing Absorptive Pad 07/25/24 0900       Plan:          89 y/o male in with bilat occlusion/stenosis of carotids.  He is awake alert oriented and mobil up to chair on rounds.   Able to stand for buttock crease assessment. See photos.    Care put inplace and orders to nursing.    Instructed pt on need to offload buttocks whenever in bed and turn on a regular basis and to shift buttocks every 15-30 minutes when in chair.    Instructed to call nurse for needs.   Will check on him weekly while in hospital.  Family at bedside.

## 2024-07-25 NOTE — PROGRESS NOTES
[facility]   Rounding Progress Note  Cardiothoracic Surgery    SUBJECTIVE:     Chief Complaint/Reason for Admission: s/p R CEA    NAEON; AF and HDS  Pain is well controlled  Still with asymmetric smile  Tolerating diet  Having bowel function    No current facility-administered medications on file prior to encounter.     Current Outpatient Medications on File Prior to Encounter   Medication Sig Dispense Refill    clopidogreL (PLAVIX) 75 mg tablet Take 75 mg by mouth once daily.      docusate sodium (COLACE) 100 MG capsule Take 100 mg by mouth Daily.      ENTRESTO 49-51 mg per tablet Take 1 tablet by mouth 2 (two) times daily.      furosemide (LASIX) 20 MG tablet Take 20 mg by mouth as needed.      glipiZIDE 5 MG TR24 Take 5 mg by mouth daily with breakfast.      simvastatin (ZOCOR) 40 MG tablet Take 40 mg by mouth every evening.      cyproheptadine (PERIACTIN) 4 mg tablet Take 4 mg by mouth 2 (two) times daily.      ondansetron (ZOFRAN-ODT) 4 MG TbDL Take 1 tablet (4 mg total) by mouth every 12 (twelve) hours as needed (n/v). 20 tablet 0    ONETOUCH ULTRA TEST Strp as needed.          Review of patient's allergies indicates:   Allergen Reactions    Benicar [olmesartan] Other (See Comments)        Past Medical History:   Diagnosis Date    Bleeding ulcer     Bradycardia     CAD (coronary artery disease)     Carotid disease, bilateral     Chronic kidney failure, stage 3 (moderate)     Cough     Diabetes mellitus     Fatigue     HLD (hyperlipidemia)     Hypercholesteremia     Lightheadedness     Loss of appetite     PAD (peripheral artery disease)     Skin cancer of face     SOB (shortness of breath)     Stroke     Syncope     Walker as ambulation aid     Weakness     Weight loss, unintentional         Past Surgical History:   Procedure Laterality Date    CAROTID ENDARTERECTOMY Left 09/08/2023    Procedure: ENDARTERECTOMY-CAROTID;  Surgeon: Jacquie Maza MD;  Location: [facility];  Service: Cardiology;   Laterality: Left;  REQ TF - 900    CAROTID ENDARTERECTOMY Right 7/23/2024    Procedure: ENDARTERECTOMY-CAROTID;  Surgeon: Jacquie Maza MD;  Location: [facility] OR;  Service: Cardiology;  Laterality: Right;    CATARACT EXTRACTION Bilateral     ESOPHAGOGASTRODUODENOSCOPY      INSERTION OF PACEMAKER N/A 2/14/2024    Procedure: INSERTION, PACEMAKER;  Surgeon: Jacquie Maza MD;  Location: [facility] OR;  Service: Cardiology;  Laterality: N/A;  Medtronic Rep-Dual Chamber Pacemaker    SKIN CANCER EXCISION      multiple        OBJECTIVE:        Physical Exam:  Physical Exam  Constitutional:       Appearance: Normal appearance. He is not ill-appearing.   HENT:      Head: Normocephalic and atraumatic.      Nose: Nose normal.      Mouth/Throat:      Mouth: Mucous membranes are moist.   Eyes:      Conjunctiva/sclera: Conjunctivae normal.   Neck:      Comments: Incision is c/d/I, some swelling noted to the lateral superior incision, neck is soft  Cardiovascular:      Rate and Rhythm: Normal rate and regular rhythm.      Pulses: Normal pulses.   Pulmonary:      Effort: Pulmonary effort is normal. No respiratory distress.   Abdominal:      General: Abdomen is flat. There is no distension.      Palpations: Abdomen is soft.      Tenderness: There is no abdominal tenderness.   Musculoskeletal:         General: No swelling or tenderness. Normal range of motion.      Cervical back: Normal range of motion and neck supple.   Skin:     General: Skin is warm and dry.      Capillary Refill: Capillary refill takes less than 2 seconds.   Neurological:      Mental Status: He is alert and oriented to person, place, and time. Mental status is at baseline.      Cranial Nerves: No cranial nerve deficit.      Sensory: No sensory deficit.      Motor: No weakness.      Comments: Does have asymmetric smile on the left          Laboratory:  I have reviewed all pertinent lab results within the past 24 hours.  CBC:   Recent Labs   Lab 07/24/24  0742   WBC  19.3  19.29*   RBC 3.51*   HGB 11.7*   HCT 36.3*      .4*   MCH 33.3*   MCHC 32.2*     BMP:   Recent Labs   Lab 07/24/24  0838 07/24/24  1157 07/25/24  0439     --  136   K 6.3*   < > 5.9*     --  106   CO2 25  --  22*   BUN 51.0*  --  58.1*   CREATININE 2.96*  --  2.79*   CALCIUM 8.2*  --  8.5*   MG 2.20  --   --     < > = values in this interval not displayed.       Diagnostic Results:  none          ASSESSMENT/PLAN:   89 M s/p R CEA    -doing well this morning, still with some drooping at the corner of the mouth  -SUDHAKAR and hyperkalemia, nephrology following, appreciate their assistiance, started lokelma and giving lasix  -Cont diet    Cedric Galvan MD, PGY-4  Cardiothoracic Surgery

## 2024-07-25 NOTE — PROGRESS NOTES
CT SURGERY PROGRESS NOTE  Sandra Rees  89 y.o.  9/11/1930    Patients Procedure: Procedure(s) (LRB):  ENDARTERECTOMY-CAROTID (Right)    Subjective  Interval History: Patient is postoperative day 2 from right carotid endarterectomy.  VSS.  Currently on 5 L NC.  NS infusing at 75 cc / hr.  Complaining of some shortness of breath and wheezing this morning.       Review of Systems   Constitutional:  Negative for chills, fever and malaise/fatigue.   Respiratory:  Positive for shortness of breath and wheezing. Negative for cough.    Cardiovascular:  Negative for chest pain, palpitations and leg swelling.   Gastrointestinal:  Negative for nausea and vomiting.       Medication List  Infusions    Scheduled   albuterol-ipratropium  3 mL Nebulization Q4H    aspirin  81 mg Oral Daily    clopidogreL  75 mg Oral Daily    docusate sodium  100 mg Oral Daily    furosemide (LASIX) injection  40 mg Intravenous Once    glipiZIDE  5 mg Oral Daily with breakfast    mupirocin   Nasal BID    sodium zirconium cyclosilicate  10 g Oral TID    zinc oxide-cod liver oil   Topical (Top) BID       Objective:  Recent Vitals:  Temp:  [96.8 °F (36 °C)-98.2 °F (36.8 °C)] 97.5 °F (36.4 °C)  Pulse:  [74-90] 90  Resp:  [18-22] 22  SpO2:  [91 %-97 %] 91 %  BP: (100-116)/(58-75) 116/75    Physical Exam  Constitutional:       General: He is not in acute distress.     Appearance: He is not ill-appearing.      Interventions: Nasal cannula in place.   HENT:      Head: Normocephalic and atraumatic.      Mouth/Throat:      Mouth: Mucous membranes are moist.      Pharynx: Oropharynx is clear.   Neck:      Comments: Right side edema and ecchymosis surrounding incision  Cardiovascular:      Rate and Rhythm: Normal rate and regular rhythm.      Pulses: Normal pulses.      Heart sounds: Normal heart sounds. No murmur heard.     No friction rub. No gallop.   Pulmonary:      Effort: Pulmonary effort is normal.      Breath sounds: Wheezing and rales (scattered) present.    Abdominal:      General: There is no distension.      Palpations: Abdomen is soft.   Musculoskeletal:         General: No swelling.      Cervical back: Normal range of motion and neck supple.      Right lower leg: No edema.      Left lower leg: No edema.   Skin:     General: Skin is warm and dry.      Comments: Right neck incision perry - c/d/I.  Small amount of ecchymosis and surrounding edema noted.  Soft to palpation   Neurological:      Mental Status: He is alert and oriented to person, place, and time.      Cranial Nerves: Dysarthria and facial asymmetry present. No cranial nerve deficit.      Sensory: No sensory deficit.      Motor: No weakness.   Psychiatric:         Mood and Affect: Mood normal.         Behavior: Behavior normal.          I/O last 24 hrs:  Intake/Output - Last 3 Shifts         07/23 0700 07/24 0659 07/24 0700 07/25 0659 07/25 0700 07/26 0659    IV Piggyback 1200      Total Intake(mL/kg) 1200 (18.2)      Urine (mL/kg/hr) 175 (0.1) 1 (0)     Stool 0      Total Output 175 1     Net +1025 -1            Stool Occurrence 0 x              Labs  CBC:   Recent Labs   Lab 07/24/24  0742   WBC 19.3  19.29*   RBC 3.51*   HGB 11.7*   HCT 36.3*      .4*   MCH 33.3*   MCHC 32.2*     CMP:   Recent Labs   Lab 07/25/24  0439   CALCIUM 8.5*      K 5.9*   CO2 22*      BUN 58.1*   CREATININE 2.79*       ASSESSMENT/PLAN:    Severe right carotid artery stenosis  SUDHAKAR on CKD III   Type II DM  CAD    -s/p R CEA  -Appears overloaded this morning.  Will await Nephrology recs prior to diuresing.  Stop IVF for now.    -OOB, ambulate as tolerated    Case and plan of care discussed with Dr. Link Araya, CHLOE

## 2024-07-25 NOTE — CONSULTS
[facility] - [facility]  Nephrology  Consult Note    Patient Name: Sandra Rees  MRN: [id number]  Admission Date: 7/23/2024  Hospital Length of Stay: 2 days  Attending Provider: Jacquie Maza MD   Primary Care Physician: Luis Shah MD  Principal Problem:<principal problem not specified>    Consults  Subjective:     HPI: This is a 89-year-old make status post R CEA on 7/23 per Dr Maza. Post operatively has been noted to have SUDHAKAR and hyperkalemia. PMH significant for CKD III with baseline Cr 1.7/GFR 35, HTN, DM II, CAD, CVA. He seems to also have poor appetite noted to take periactin at home. Nephrology consulted for assistance with SUDHAKAR and hyperkalemia.     He is followed by Dr Ferrer in [place] and has been stable in regards to CKD. Patient and family does endorse minimal urine with each void and need to strain. He is on NC at 5L now with diffuse crackles on exam. No peripheral edema.     Past Medical History:   Diagnosis Date    Bleeding ulcer     Bradycardia     CAD (coronary artery disease)     Carotid disease, bilateral     Chronic kidney failure, stage 3 (moderate)     Cough     Diabetes mellitus     Fatigue     HLD (hyperlipidemia)     Hypercholesteremia     Lightheadedness     Loss of appetite     PAD (peripheral artery disease)     Skin cancer of face     SOB (shortness of breath)     Stroke     Syncope     Walker as ambulation aid     Weakness     Weight loss, unintentional        Past Surgical History:   Procedure Laterality Date    CAROTID ENDARTERECTOMY Left 09/08/2023    Procedure: ENDARTERECTOMY-CAROTID;  Surgeon: Jacquie Maza MD;  Location: [facility];  Service: Cardiology;  Laterality: Left;  REQ TF - 900    CAROTID ENDARTERECTOMY Right 7/23/2024    Procedure: ENDARTERECTOMY-CAROTID;  Surgeon: Jacquie Maza MD;  Location: [facility] OR;  Service: Cardiology;  Laterality: Right;    CATARACT EXTRACTION Bilateral     ESOPHAGOGASTRODUODENOSCOPY      INSERTION OF  PACEMAKER N/A 2024    Procedure: INSERTION, PACEMAKER;  Surgeon: Jacquie Maza MD;  Location: [facility] OR;  Service: Cardiology;  Laterality: N/A;  Medtronic Rep-Dual Chamber Pacemaker    SKIN CANCER EXCISION      multiple       Review of patient's allergies indicates:   Allergen Reactions    Benicar [olmesartan] Other (See Comments)     Current Facility-Administered Medications   Medication Frequency    acetaminophen tablet 650 mg Q6H PRN    albuterol-ipratropium 2.5 mg-0.5 mg/3 mL nebulizer solution 3 mL Q4H    aspirin chewable tablet 81 mg Daily    bismuth subsalicylate tablet 524 mg Once    calcium gluconate 1 g in NS IVPB (premixed) Q10 Min PRN    clopidogreL tablet 75 mg Daily    dextrose 10% bolus 125 mL 125 mL PRN    dextrose 10% bolus 250 mL 250 mL PRN    dextrose 10% bolus 250 mL 250 mL PRN    docusate sodium capsule 100 mg Daily    glipiZIDE 24 hr tablet 5 mg Daily with breakfast    HYDROcodone-acetaminophen 5-325 mg per tablet 1 tablet Q4H PRN    mupirocin 2 % ointment BID    ondansetron injection 4 mg Q6H PRN    sodium zirconium cyclosilicate packet 10 g TID    zinc oxide-cod liver oil 40 % paste PRN     Family History    None       Tobacco Use    Smoking status: Former     Current packs/day: 0.00     Types: Cigarettes, Pipe     Quit date:      Years since quittin.5    Smokeless tobacco: Former   Substance and Sexual Activity    Alcohol use: Never    Drug use: Never    Sexual activity: Not on file     Review of Systems   Constitutional:  Negative for chills and fever.   Respiratory:  Positive for shortness of breath.    Cardiovascular:  Negative for chest pain and leg swelling.   Genitourinary:  Positive for difficulty urinating and frequency. Negative for hematuria.     Objective:     Vital Signs (Most Recent):  Temp: 97.6 °F (36.4 °C) (24 050)  Pulse: 74 (24 0508)  Resp: 20 (24 0412)  BP: 112/75 (24 050)  SpO2: (!) 91 % (24 050) Vital Signs (24h  "Range):  Temp:  [96.8 °F (36 °C)-98.2 °F (36.8 °C)] 97.6 °F (36.4 °C)  Pulse:  [74-95] 74  Resp:  [18-20] 20  SpO2:  [91 %-94 %] 91 %  BP: ()/(57-75) 112/75     Weight: 65.9 kg (145 lb 3.2 oz) (07/23/24 0530)  Body mass index is 24.92 kg/m².  Body surface area is 1.73 meters squared.    I/O last 3 completed shifts:  In: -   Out: 1 [Urine:1]    Physical Exam  Constitutional:       General: He is not in acute distress.  HENT:      Head: Atraumatic.   Eyes:      Extraocular Movements: Extraocular movements intact.      Conjunctiva/sclera: Conjunctivae normal.   Neck:      Comments: R neck surgical incision approximated   Cardiovascular:      Rate and Rhythm: Normal rate and regular rhythm.   Pulmonary:      Breath sounds: Rales present.      Comments: NC 5L  Musculoskeletal:         General: No swelling.      Cervical back: Neck supple.   Skin:     General: Skin is warm.   Neurological:      General: No focal deficit present.      Mental Status: He is alert and oriented to person, place, and time.         Significant Labs:  BMP:   Recent Labs   Lab 07/24/24  0838 07/24/24  1157 07/25/24  0439     --  136   K 6.3*   < > 5.9*     --  106   CO2 25  --  22*   BUN 51.0*  --  58.1*   CREATININE 2.96*  --  2.79*   CALCIUM 8.2*  --  8.5*   MG 2.20  --   --     < > = values in this interval not displayed.     CBC:   Recent Labs   Lab 07/24/24  0742   WBC 19.3  19.29*   RBC 3.51*   HGB 11.7*   HCT 36.3*      .4*   MCH 33.3*   MCHC 32.2*     Microbiology Results (last 7 days)       ** No results found for the last 168 hours. **          Specimen (24h ago, onward)      None          No results for input(s): "COLORU", "CLARITYU", "SPECGRAV", "PHUR", "PROTEINUA", "GLUCOSEU", "BILIRUBINCON", "BLOODU", "WBCU", "RBCU", "BACTERIA", "MUCUS", "NITRITE", "LEUKOCYTESUR", "UROBILINOGEN", "HYALINECASTS" in the last 168 hours.    Significant Imaging:    No imaging this admission     Assessment/Plan:     SUDHAKAR on " CKD III - bnaselline Cr 1.7/GFR 35  Status post right CEA 7/23  DM II  CAD    - Worsening hyperkalemia noted.   - Stop ARB now and start Lokelma 10 g TID  - CXR now to assess for diuretic needs   -Check Renal US and urine studies for complete evaluation of SUDHAKAR     JEN Lira  Nephrology  [facility] - [address] [place] [address]

## 2024-07-26 VITALS
BODY MASS INDEX: 24.79 KG/M2 | HEIGHT: 64 IN | RESPIRATION RATE: 18 BRPM | TEMPERATURE: 98 F | SYSTOLIC BLOOD PRESSURE: 106 MMHG | HEART RATE: 96 BPM | DIASTOLIC BLOOD PRESSURE: 65 MMHG | WEIGHT: 145.19 LBS | OXYGEN SATURATION: 94 %

## 2024-07-26 PROBLEM — I65.21 INTERNAL CAROTID ARTERY STENOSIS, RIGHT: Status: ACTIVE | Noted: 2024-07-26

## 2024-07-26 LAB
ANION GAP SERPL CALC-SCNC: 10 MEQ/L
BUN SERPL-MCNC: 62.2 MG/DL (ref 8.4–25.7)
CALCIUM SERPL-MCNC: 8.4 MG/DL (ref 8.8–10)
CHLORIDE SERPL-SCNC: 106 MMOL/L (ref 98–111)
CO2 SERPL-SCNC: 22 MMOL/L (ref 23–31)
CREAT SERPL-MCNC: 2.48 MG/DL (ref 0.73–1.18)
CREAT/UREA NIT SERPL: 25
GFR SERPLBLD CREATININE-BSD FMLA CKD-EPI: 24 ML/MIN/1.73/M2
GLUCOSE SERPL-MCNC: 146 MG/DL (ref 75–121)
POTASSIUM SERPL-SCNC: 4.6 MMOL/L (ref 3.5–5.1)
SODIUM SERPL-SCNC: 138 MMOL/L (ref 136–145)

## 2024-07-26 PROCEDURE — 25000003 PHARM REV CODE 250

## 2024-07-26 PROCEDURE — 25000003 PHARM REV CODE 250: Performed by: NURSE PRACTITIONER

## 2024-07-26 PROCEDURE — 99900035 HC TECH TIME PER 15 MIN (STAT)

## 2024-07-26 PROCEDURE — 94640 AIRWAY INHALATION TREATMENT: CPT

## 2024-07-26 PROCEDURE — 99900031 HC PATIENT EDUCATION (STAT)

## 2024-07-26 PROCEDURE — 94761 N-INVAS EAR/PLS OXIMETRY MLT: CPT

## 2024-07-26 PROCEDURE — 80048 BASIC METABOLIC PNL TOTAL CA: CPT | Performed by: STUDENT IN AN ORGANIZED HEALTH CARE EDUCATION/TRAINING PROGRAM

## 2024-07-26 PROCEDURE — 94760 N-INVAS EAR/PLS OXIMETRY 1: CPT

## 2024-07-26 PROCEDURE — 27000221 HC OXYGEN, UP TO 24 HOURS

## 2024-07-26 PROCEDURE — 36415 COLL VENOUS BLD VENIPUNCTURE: CPT | Performed by: STUDENT IN AN ORGANIZED HEALTH CARE EDUCATION/TRAINING PROGRAM

## 2024-07-26 PROCEDURE — 25000242 PHARM REV CODE 250 ALT 637 W/ HCPCS: Performed by: PHYSICIAN ASSISTANT

## 2024-07-26 RX ORDER — NAPROXEN SODIUM 220 MG/1
81 TABLET, FILM COATED ORAL DAILY
Start: 2024-07-27 | End: 2025-07-27

## 2024-07-26 RX ADMIN — IPRATROPIUM BROMIDE AND ALBUTEROL SULFATE 3 ML: .5; 3 SOLUTION RESPIRATORY (INHALATION) at 12:07

## 2024-07-26 RX ADMIN — CLOPIDOGREL BISULFATE 75 MG: 75 TABLET ORAL at 08:07

## 2024-07-26 RX ADMIN — SODIUM ZIRCONIUM CYCLOSILICATE 10 G: 10 POWDER, FOR SUSPENSION ORAL at 08:07

## 2024-07-26 RX ADMIN — IPRATROPIUM BROMIDE AND ALBUTEROL SULFATE 3 ML: .5; 3 SOLUTION RESPIRATORY (INHALATION) at 08:07

## 2024-07-26 RX ADMIN — GLIPIZIDE 5 MG: 5 TABLET, FILM COATED, EXTENDED RELEASE ORAL at 08:07

## 2024-07-26 RX ADMIN — SODIUM ZIRCONIUM CYCLOSILICATE 10 G: 10 POWDER, FOR SUSPENSION ORAL at 03:07

## 2024-07-26 RX ADMIN — Medication: at 08:07

## 2024-07-26 RX ADMIN — IPRATROPIUM BROMIDE AND ALBUTEROL SULFATE 3 ML: .5; 3 SOLUTION RESPIRATORY (INHALATION) at 03:07

## 2024-07-26 RX ADMIN — ASPIRIN 81 MG CHEWABLE TABLET 81 MG: 81 TABLET CHEWABLE at 08:07

## 2024-07-26 RX ADMIN — DOCUSATE SODIUM 100 MG: 100 CAPSULE, LIQUID FILLED ORAL at 03:07

## 2024-07-26 NOTE — AI DETERIORATION ALERT
Artificial Intelligence Notification  [facility]  [address]  [place] [address]  Phone: [phone]    This documentation was triggered by an Artificial Intelligence Notification:    Admit Date: 2024   LOS: 2  Code Status: Full Code  : 1930  Age: 89 y.o.  Weight:   Wt Readings from Last 1 Encounters:   24 65.9 kg (145 lb 3.2 oz)        Sex: male  Bed: [address] [place] [address] A  MRN: [id number]  Attending Physician: Jacquie Maza MD     Date of Alert: 2024  Time AI Alert Received:             Vitals:    24   BP:    Pulse: 102   Resp: (!) 22   Temp:      SpO2: (!) 90 %      Artificial Intelligence alert discussed with Provider:     Name: SHELLY Armstrong   Date/Time of Provider Notification: 2024       Patient Condition: Vital signs stable. No needs or recs. Call with any changes.

## 2024-07-26 NOTE — DISCHARGE SUMMARY
[facility] [address]  Cardiothoracic Surgery  Discharge Summary      Patient Name: Sandra Rees  MRN: [id number]  Admission Date: 7/23/2024  Hospital Length of Stay: 3 days  Discharge Date and Time: No discharge date for patient encounter.  Attending Physician: Jacquie Maza MD   Discharging Provider: MARIA Dillon  Primary Care Provider: Luis Shah MD    HPI:   No notes on file    Procedure(s) (LRB):  ENDARTERECTOMY-CAROTID (Right)      Indwelling Lines/Drains at time of discharge:   Lines/Drains/Airways       None                 Hospital Course: No notes on file    Goals of Care Treatment Preferences:         Consults (From admission, onward)          Status Ordering Provider     Inpatient consult to Nephrology  Once        Provider:  Dann Grady MD    Acknowledged JACQUIE MAZA            Significant Diagnostic Studies: Labs: All labs within the past 24 hours have been reviewed    Pending Diagnostic Studies:       None            No new Assessment & Plan notes have been filed under this hospital service since the last note was generated.  Service: Cardiothoracic Surgery    Final Active Diagnoses:    Diagnosis Date Noted POA    PRINCIPAL PROBLEM:  Internal carotid artery stenosis, right [I65.21] 07/26/2024 Yes    Occlusion and stenosis of bilateral carotid arteries [I65.23] 09/09/2023 Yes      Problems Resolved During this Admission:      Discharged Condition: good    Disposition: Home-Health Care [facility]    Follow Up:   Contact information for follow-up providers       Jacquie Maza MD Follow up in 3 week(s).    Specialties: Cardiothoracic Surgery, Cardiology  Contact information:  [address] [place] [address] Dr  [address]  [facility] [address]  [phone]                       Contact information for after-discharge care       Home Medical Care       [facility] .    Service: Home Health Services  Contact information:  [address]  [place] [address]  [phone]                                 Patient Instructions:      SUBSEQUENT HOME HEALTH ORDERS   Order Comments: Arrange Home Health services; eval and treat for all disciplines   PCP:  Dr. Shah ([place], [place])     Order Specific Question Answer Comments   What Home Health Agency is the patient currently using? Other/External      Medications:  Reconciled Home Medications:      Medication List        START taking these medications      aspirin 81 MG Chew  Take 1 tablet (81 mg total) by mouth once daily.  Start taking on: July 27, 2024            CONTINUE taking these medications      clopidogreL 75 mg tablet  Commonly known as: PLAVIX  Take 75 mg by mouth once daily.     glipiZIDE 5 MG Tr24  Take 5 mg by mouth daily with breakfast.     simvastatin 40 MG tablet  Commonly known as: ZOCOR  Take 40 mg by mouth every evening.            STOP taking these medications      COLACE 100 MG capsule  Generic drug: docusate sodium     cyproheptadine 4 mg tablet  Commonly known as: PERIACTIN     ENTRESTO 49-51 mg per tablet  Generic drug: sacubitriL-valsartan     furosemide 20 MG tablet  Commonly known as: LASIX     ondansetron 4 MG Tbdl  Commonly known as: ZOFRAN-ODT     ONETOUCH ULTRA TEST Strp  Generic drug: blood sugar diagnostic            POD #3  Awake. Alert.  Doing well  Tolerating po diet  Potassium normalized  Denies sob  Renal ok for discharge    AFVSS. 96% on RA  Heart: RRR  Lungs: respirations nonlabored, clear  Incision: c/d/I  Neuro: stable    A/p: s/p R CEA  D/c home  F/u 3 weeks with Dr. Maza    Time spent on the discharge of patient: 20 minutes    MARIA Dillon  Cardiothoracic Surgery  [facility] - [facility]

## 2024-07-26 NOTE — NURSING
Nurses Note -- 4 Eyes      7/26/2024   5:28 AM      Skin assessed during: Q Shift Change      [] No Altered Skin Integrity Present    []Prevention Measures Documented      [x] Yes- Altered Skin Integrity Present or Discovered   [] LDA Added if Not in Epic (Describe Wound)   [] New Altered Skin Integrity was Present on Admit and Documented in LDA   [] Wound Image Taken    Wound Care Consulted? No    Attending Nurse:  SHELLY Armstrong     Second RN/Staff Member:  SHELLY Gonzalez

## 2024-07-26 NOTE — PROGRESS NOTES
[facility] - [facility] Telemetry  Nephrology      Patient Name: Sandra Rees  MRN: [id number]  Admission Date: 7/23/2024  Hospital Length of Stay: 3 days  Attending Provider: Jacquie Maza MD   Primary Care Physician: Luis Shah MD  Principal Problem:<principal problem not specified>         This is a 89-year-old make status post R CEA on 7/23 per Dr Maza. Post operatively has been noted to have SUDHAKAR and hyperkalemia. PMH significant for CKD III with baseline Cr 1.7/GFR 35, HTN, DM II, CAD, CVA. He seems to also have poor appetite noted to take periactin at home. Nephrology consulted for assistance with SUDHAKAR and hyperkalemia.     He is followed by Dr Ferrer in [place] and has been stable in regards to CKD. Patient and family does endorse minimal urine with each void and need to strain. He is on NC at 5L now with diffuse crackles on exam. No peripheral edema.     07/26/2024   No more shortness of breath.  Feeling good.  Potassium has normalized.  Kidney functions are improving.  Patient even remember that I have seen him in the past in [place].    Past Medical History:   Diagnosis Date    Bleeding ulcer     Bradycardia     CAD (coronary artery disease)     Carotid disease, bilateral     Chronic kidney failure, stage 3 (moderate)     Cough     Diabetes mellitus     Fatigue     HLD (hyperlipidemia)     Hypercholesteremia     Lightheadedness     Loss of appetite     PAD (peripheral artery disease)     Skin cancer of face     SOB (shortness of breath)     Stroke     Syncope     Walker as ambulation aid     Weakness     Weight loss, unintentional        Past Surgical History:   Procedure Laterality Date    CAROTID ENDARTERECTOMY Left 09/08/2023    Procedure: ENDARTERECTOMY-CAROTID;  Surgeon: aJcquie Maza MD;  Location: [facility];  Service: Cardiology;  Laterality: Left;  REQ TF - 900    CAROTID ENDARTERECTOMY Right 7/23/2024    Procedure: ENDARTERECTOMY-CAROTID;  Surgeon: Jacquie Maza  MD ARUN;  Location: [facility];  Service: Cardiology;  Laterality: Right;    CATARACT EXTRACTION Bilateral     ESOPHAGOGASTRODUODENOSCOPY      INSERTION OF PACEMAKER N/A 2024    Procedure: INSERTION, PACEMAKER;  Surgeon: Jacquie Maza MD;  Location: [facility];  Service: Cardiology;  Laterality: N/A;  [employer]onic Rep-Dual Chamber Pacemaker    SKIN CANCER EXCISION      multiple       Review of patient's allergies indicates:   Allergen Reactions    Benicar [olmesartan] Other (See Comments)     Current Facility-Administered Medications   Medication Frequency    acetaminophen tablet 650 mg Q6H PRN    albuterol-ipratropium 2.5 mg-0.5 mg/3 mL nebulizer solution 3 mL Q4H    aspirin chewable tablet 81 mg Daily    calcium gluconate 1 g in NS IVPB (premixed) Q10 Min PRN    clopidogreL tablet 75 mg Daily    dextrose 10% bolus 125 mL 125 mL PRN    dextrose 10% bolus 250 mL 250 mL PRN    docusate sodium capsule 100 mg Daily    glipiZIDE 24 hr tablet 5 mg Daily with breakfast    HYDROcodone-acetaminophen 5-325 mg per tablet 1 tablet Q4H PRN    ondansetron injection 4 mg Q6H PRN    sodium zirconium cyclosilicate packet 10 g TID    zinc oxide-cod liver oil 40 % paste PRN    zinc oxide-cod liver oil 40 % paste BID     Family History    None       Tobacco Use    Smoking status: Former     Current packs/day: 0.00     Types: Cigarettes, Pipe     Quit date:      Years since quittin.5    Smokeless tobacco: Former   Substance and Sexual Activity    Alcohol use: Never    Drug use: Never    Sexual activity: Not on file     Review of Systems   Constitutional:  Negative for chills and fever.   Cardiovascular:  Negative for chest pain and leg swelling.   Genitourinary:  Positive for frequency. Negative for hematuria.     Objective:     Vital Signs (Most Recent):  Temp: 97.7 °F (36.5 °C) (24)  Pulse: 99 (24)  Resp: 20 (24)  BP: 107/67 (24)  SpO2: 96 % (24) Vital Signs (24h  Range):  Temp:  [97.5 °F (36.4 °C)-98.1 °F (36.7 °C)] 97.7 °F (36.5 °C)  Pulse:  [] 99  Resp:  [18-31] 20  SpO2:  [90 %-98 %] 96 %  BP: ()/(55-76) 107/67     Weight: 65.9 kg (145 lb 3.2 oz) (07/23/24 0530)  Body mass index is 24.92 kg/m².  Body surface area is 1.73 meters squared.    I/O last 3 completed shifts:  In: 480 [P.O.:480]  Out: 704 [Urine:704]    Physical Exam  Constitutional:       General: He is not in acute distress.  HENT:      Head: Atraumatic.   Eyes:      Extraocular Movements: Extraocular movements intact.      Conjunctiva/sclera: Conjunctivae normal.   Neck:      Comments: R neck surgical incision approximated   Cardiovascular:      Rate and Rhythm: Normal rate and regular rhythm.   Pulmonary:      Comments: NC1L  Abdominal:      General: Bowel sounds are normal.   Musculoskeletal:         General: No swelling.      Cervical back: Neck supple.   Skin:     General: Skin is warm.   Neurological:      General: No focal deficit present.      Mental Status: He is alert and oriented to person, place, and time.   Psychiatric:         Mood and Affect: Mood normal.         Behavior: Behavior normal.         Significant Labs:  BMP:   Recent Labs   Lab 07/24/24  0838 07/24/24  1157 07/26/24  0444      < > 138   K 6.3*   < > 4.6      < > 106   CO2 25   < > 22*   BUN 51.0*   < > 62.2*   CREATININE 2.96*   < > 2.48*   CALCIUM 8.2*   < > 8.4*   MG 2.20  --   --     < > = values in this interval not displayed.     CBC:   Recent Labs   Lab 07/24/24  0742   WBC 19.3  19.29*   RBC 3.51*   HGB 11.7*   HCT 36.3*      .4*   MCH 33.3*   MCHC 32.2*         Renal ultrasound reveals bilateral small kidneys suggestive of chronic kidney disease.    Assessment/Plan:     SUDHAKAR resolved  CKD 4 secondary to hypertensive nephrosclerosis  Status post right CEA 7/23  DM II  CAD    Recommendations  Okay for discharge from the renal standpoint of view  Patient is advised to follow low-salt diet  also low-protein diet.  He will follow with Dr. Nabil Mccray.    Jadyn Diaz MD  Nephrology  [facility] - [address] [place] [address]

## 2024-07-27 ENCOUNTER — NURSE TRIAGE (OUTPATIENT)
Dept: ADMINISTRATIVE | Facility: CLINIC | Age: 89
End: 2024-07-27
Payer: MEDICARE

## 2024-07-27 NOTE — TELEPHONE ENCOUNTER
Daughter Luisana Rees calling on behalf of pt. States that pt had surgery on Tuesday and placed on oxygen in hospital. States that pt was discharged last night without oxygen. Now c/o SOB with ambulating. States that O2 stats was in 80's on last night. States that pt was placed on her mom's oxygen at 2L via NC. O2 stats now 96% on oxygen. States that pt was seen by HH nurse today and unable to order oxygen. Advised to be seen within 4 hours per protocol. States that pt does not want to go to ED. Reiterated dispo. VU. Encounter routed to provider.      Reason for Disposition   [1] MILD difficulty breathing (e.g., minimal/no SOB at rest, SOB with walking, pulse <100) AND [2] new-onset    Additional Information   Negative: SEVERE difficulty breathing (e.g., struggling for each breath, speaks in single words, pulse > 120)   Negative: Bluish (or gray) lips or face now   Negative: Difficult to awaken or acting confused (e.g., disoriented, slurred speech)   Negative: Slow, shallow and weak breathing   Negative: Sounds like a life-threatening emergency to the triager   Negative: [1] MODERATE difficulty breathing (e.g., speaks in phrases, SOB even at rest, pulse 100 - 120) AND [2] new-onset or worse than normal   Negative: [1] MODERATE difficulty breathing AND [2] oxygen level (e.g., pulse oximetry) 91 to 94%   Negative: Oxygen level (e.g., pulse oximetry) 90% or lower   Negative: Patient sounds very sick or weak to the triager   Negative: [1] MODERATE difficulty breathing (e.g., speaks in phrases, SOB even at rest, pulse 100 - 120) AND [2] NOT new-onset nor worse than normal   Negative: [1] Drinking very little AND [2] dehydration suspected (e.g., no urine > 12 hours, very dry mouth, very lightheaded)    Protocols used: Oxygen Monitoring and Qufcnig-E-WV

## 2024-07-29 ENCOUNTER — PATIENT OUTREACH (OUTPATIENT)
Dept: ADMINISTRATIVE | Facility: CLINIC | Age: 89
End: 2024-07-29
Payer: MEDICARE

## 2024-07-29 RX ORDER — ACETAMINOPHEN 500 MG
500 TABLET ORAL
COMMUNITY

## 2024-07-29 NOTE — PROGRESS NOTES
C3 nurse spoke with patient's daughter-in-law, Luisana for a TCC post hospital discharge follow up call. The patient has a scheduled HOSFU appointment with Dr. Maza  on 08/07/2024 @ 950 am.

## 2024-08-07 NOTE — PHYSICIAN QUERY
Please clarify Dysarthria and facial asymmetry as it relates to R CEA.    Present, but not a complication of the procedure

## (undated) DEVICE — SUT MONOCRYL PLUS UD 3-0 27

## (undated) DEVICE — NDL HYPO A BEVEL 30X1/2

## (undated) DEVICE — GLOVE PROTEXIS PI SYN SURG 7.5

## (undated) DEVICE — DRAPE STERI INCISE 23X23IN

## (undated) DEVICE — KIT SURGICAL TURNOVER

## (undated) DEVICE — BAG MEDI-PLAST DECANTER C-FLOW

## (undated) DEVICE — GOWN SMARTSLEEVE AAMI LVL4 XXL

## (undated) DEVICE — DRESSING TRANS 4X4 TEGADERM

## (undated) DEVICE — GLOVE PROTEXIS NEOPRN SZ8

## (undated) DEVICE — ELECTRODE PATIENT RETURN DISP

## (undated) DEVICE — GLOVE PROTEXIS HYDROGEL SZ6.5

## (undated) DEVICE — Device

## (undated) DEVICE — GLOVE BIOGEL 7.5

## (undated) DEVICE — CLIP LIGATING MEDIUM

## (undated) DEVICE — SUT VICRYL 2-0 36 CT-1

## (undated) DEVICE — DRAPE SURGICAL STERI INCISE

## (undated) DEVICE — WIPE MERCL POLYVIL ACETL 3X3IN

## (undated) DEVICE — SUT PROLENE 6-0 BV-1 30IN

## (undated) DEVICE — TUBE SUCTION MEDI-VAC STERILE

## (undated) DEVICE — SOL NORMAL USPCA 0.9%

## (undated) DEVICE — GOWN ECLIPSE REINF LVL4 TWL XL

## (undated) DEVICE — PENCIL ELECSURG ROCKER 15FT

## (undated) DEVICE — SUT 2-0 12-18IN SILK

## (undated) DEVICE — GLOVE PROTEXIS BLUE LATEX 7

## (undated) DEVICE — CLIP LIGATING HEMOCLP SMALL

## (undated) DEVICE — SUT 3-0 12-18IN SILK

## (undated) DEVICE — SUT 2/0 30IN SILK BLK BRAI

## (undated) DEVICE — SLING ARM COMFT NAVY BLU LG

## (undated) DEVICE — NDL HYPO 22GX1 1/2 SYR 10ML LL

## (undated) DEVICE — APPLIER LIGACLIP SM 9.38IN

## (undated) DEVICE — COVER CAMERA 21X16X19IN

## (undated) DEVICE — SPONGE LAP 18X18 PREWASHED

## (undated) DEVICE — NDL HYPO REG 25G X 1 1/2

## (undated) DEVICE — DRESSING TELFA PAD N ADH 2X3

## (undated) DEVICE — PAD N ADH STRL 2X3IN

## (undated) DEVICE — GLOVE PROTEXIS BLUE LATEX 6.5

## (undated) DEVICE — SUT PROLENE 5-0 36IN C-1

## (undated) DEVICE — SOL NACL IRR 1000ML BTL

## (undated) DEVICE — COVER C-ARM STRAP BAND 44X80IN

## (undated) DEVICE — DRAPE INCISE IOBAN 2 13X13IN

## (undated) DEVICE — SPONGE GAUZE 16PLY 4X4